# Patient Record
Sex: FEMALE | Race: WHITE | HISPANIC OR LATINO | Employment: OTHER
[De-identification: names, ages, dates, MRNs, and addresses within clinical notes are randomized per-mention and may not be internally consistent; named-entity substitution may affect disease eponyms.]

---

## 2021-01-04 ENCOUNTER — HEALTH MAINTENANCE LETTER (OUTPATIENT)
Age: 56
End: 2021-01-04

## 2021-10-11 ENCOUNTER — HEALTH MAINTENANCE LETTER (OUTPATIENT)
Age: 56
End: 2021-10-11

## 2021-12-16 ENCOUNTER — TRANSFERRED RECORDS (OUTPATIENT)
Dept: MULTI SPECIALTY CLINIC | Facility: CLINIC | Age: 56
End: 2021-12-16

## 2022-01-30 ENCOUNTER — HEALTH MAINTENANCE LETTER (OUTPATIENT)
Age: 57
End: 2022-01-30

## 2022-09-24 ENCOUNTER — HEALTH MAINTENANCE LETTER (OUTPATIENT)
Age: 57
End: 2022-09-24

## 2022-10-01 ENCOUNTER — TRANSFERRED RECORDS (OUTPATIENT)
Dept: MULTI SPECIALTY CLINIC | Facility: CLINIC | Age: 57
End: 2022-10-01

## 2023-03-16 ENCOUNTER — VIRTUAL VISIT (OUTPATIENT)
Dept: FAMILY MEDICINE | Facility: CLINIC | Age: 58
End: 2023-03-16
Payer: COMMERCIAL

## 2023-03-16 DIAGNOSIS — M54.2 CHRONIC NECK PAIN: ICD-10-CM

## 2023-03-16 DIAGNOSIS — F90.9 ADULT ATTENTION DEFICIT HYPERACTIVITY DISORDER: ICD-10-CM

## 2023-03-16 DIAGNOSIS — F41.0 GENERALIZED ANXIETY DISORDER WITH PANIC ATTACKS: Primary | ICD-10-CM

## 2023-03-16 DIAGNOSIS — G89.29 CHRONIC NECK PAIN: ICD-10-CM

## 2023-03-16 DIAGNOSIS — F33.2 SEVERE EPISODE OF RECURRENT MAJOR DEPRESSIVE DISORDER, WITHOUT PSYCHOTIC FEATURES (H): ICD-10-CM

## 2023-03-16 DIAGNOSIS — F41.1 GENERALIZED ANXIETY DISORDER WITH PANIC ATTACKS: Primary | ICD-10-CM

## 2023-03-16 DIAGNOSIS — G63 POLYNEUROPATHY ASSOCIATED WITH UNDERLYING DISEASE (H): ICD-10-CM

## 2023-03-16 PROBLEM — K76.0 FATTY LIVER: Status: ACTIVE | Noted: 2019-08-26

## 2023-03-16 PROBLEM — J45.30 MILD PERSISTENT ASTHMA WITHOUT COMPLICATION: Status: ACTIVE | Noted: 2018-04-27

## 2023-03-16 PROBLEM — R60.0 LOWER EXTREMITY EDEMA: Status: ACTIVE | Noted: 2022-01-25

## 2023-03-16 PROBLEM — E11.3299 MILD NONPROLIFERATIVE DIABETIC RETINOPATHY ASSOCIATED WITH TYPE 2 DIABETES MELLITUS (H): Status: ACTIVE | Noted: 2020-08-05

## 2023-03-16 PROBLEM — K31.84 GASTROPARESIS: Status: ACTIVE | Noted: 2022-08-12

## 2023-03-16 PROBLEM — E11.8 TYPE 2 DIABETES MELLITUS WITH COMPLICATION, WITH LONG-TERM CURRENT USE OF INSULIN (H): Status: ACTIVE | Noted: 2019-06-11

## 2023-03-16 PROBLEM — Z79.4 TYPE 2 DIABETES MELLITUS WITH COMPLICATION, WITH LONG-TERM CURRENT USE OF INSULIN (H): Status: ACTIVE | Noted: 2019-06-11

## 2023-03-16 PROBLEM — N39.41 URGE INCONTINENCE OF URINE: Status: ACTIVE | Noted: 2022-01-25

## 2023-03-16 PROBLEM — G62.9 PERIPHERAL NEUROPATHY: Status: ACTIVE | Noted: 2019-08-26

## 2023-03-16 PROCEDURE — 99203 OFFICE O/P NEW LOW 30 MIN: CPT | Mod: VID | Performed by: FAMILY MEDICINE

## 2023-03-16 RX ORDER — FAMOTIDINE 20 MG/1
20 TABLET, FILM COATED ORAL 2 TIMES DAILY
COMMUNITY
Start: 2022-01-21 | End: 2023-06-07

## 2023-03-16 RX ORDER — AMMONIUM LACTATE 12 G/100G
LOTION TOPICAL PRN
COMMUNITY
Start: 2023-03-14 | End: 2023-06-07

## 2023-03-16 RX ORDER — EZETIMIBE 10 MG/1
1 TABLET ORAL DAILY
COMMUNITY
Start: 2021-12-06 | End: 2023-06-07

## 2023-03-16 RX ORDER — FLUTICASONE PROPIONATE 50 MCG
1 SPRAY, SUSPENSION (ML) NASAL DAILY PRN
COMMUNITY
Start: 2022-03-04 | End: 2023-06-07

## 2023-03-16 RX ORDER — INSULIN ASPART 100 [IU]/ML
90 INJECTION, SOLUTION INTRAVENOUS; SUBCUTANEOUS
COMMUNITY
Start: 2022-01-17

## 2023-03-16 RX ORDER — METFORMIN HCL 500 MG
1000 TABLET, EXTENDED RELEASE 24 HR ORAL DAILY
COMMUNITY
Start: 2023-02-27 | End: 2023-06-07

## 2023-03-16 RX ORDER — FLUTICASONE FUROATE 200 UG/1
1 POWDER RESPIRATORY (INHALATION) AT BEDTIME
COMMUNITY
Start: 2023-01-19 | End: 2023-04-13

## 2023-03-16 RX ORDER — SERTRALINE HYDROCHLORIDE 100 MG/1
2 TABLET, FILM COATED ORAL DAILY
COMMUNITY
Start: 2023-03-14 | End: 2023-06-07

## 2023-03-16 RX ORDER — HYDROCHLOROTHIAZIDE 12.5 MG/1
12.5 CAPSULE ORAL EVERY MORNING
COMMUNITY
Start: 2023-03-14 | End: 2023-04-13

## 2023-03-16 RX ORDER — LIRAGLUTIDE 6 MG/ML
0.6 INJECTION SUBCUTANEOUS DAILY
COMMUNITY
Start: 2023-02-27

## 2023-03-16 RX ORDER — LOSARTAN POTASSIUM 50 MG/1
1 TABLET ORAL DAILY
COMMUNITY
Start: 2023-03-14 | End: 2023-06-07

## 2023-03-16 RX ORDER — INSULIN GLARGINE 100 [IU]/ML
110 INJECTION, SOLUTION SUBCUTANEOUS 2 TIMES DAILY
COMMUNITY
Start: 2023-02-07

## 2023-03-16 RX ORDER — ALBUTEROL SULFATE 90 UG/1
1-2 AEROSOL, METERED RESPIRATORY (INHALATION) EVERY 4 HOURS PRN
COMMUNITY
Start: 2022-03-04 | End: 2023-06-07

## 2023-03-16 RX ORDER — PRAMIPEXOLE DIHYDROCHLORIDE 0.25 MG/1
2 TABLET ORAL AT BEDTIME
COMMUNITY
Start: 2021-12-07 | End: 2023-06-07

## 2023-03-16 RX ORDER — ATORVASTATIN CALCIUM 80 MG/1
1 TABLET, FILM COATED ORAL DAILY
COMMUNITY
Start: 2023-03-06 | End: 2023-06-07

## 2023-03-16 RX ORDER — GABAPENTIN 600 MG/1
600 TABLET ORAL 3 TIMES DAILY
COMMUNITY
Start: 2023-03-14 | End: 2023-06-07

## 2023-03-16 ASSESSMENT — PATIENT HEALTH QUESTIONNAIRE - PHQ9
SUM OF ALL RESPONSES TO PHQ QUESTIONS 1-9: 14
SUM OF ALL RESPONSES TO PHQ QUESTIONS 1-9: 14
10. IF YOU CHECKED OFF ANY PROBLEMS, HOW DIFFICULT HAVE THESE PROBLEMS MADE IT FOR YOU TO DO YOUR WORK, TAKE CARE OF THINGS AT HOME, OR GET ALONG WITH OTHER PEOPLE: VERY DIFFICULT

## 2023-03-16 ASSESSMENT — ANXIETY QUESTIONNAIRES
8. IF YOU CHECKED OFF ANY PROBLEMS, HOW DIFFICULT HAVE THESE MADE IT FOR YOU TO DO YOUR WORK, TAKE CARE OF THINGS AT HOME, OR GET ALONG WITH OTHER PEOPLE?: SOMEWHAT DIFFICULT
7. FEELING AFRAID AS IF SOMETHING AWFUL MIGHT HAPPEN: NEARLY EVERY DAY
GAD7 TOTAL SCORE: 12
1. FEELING NERVOUS, ANXIOUS, OR ON EDGE: SEVERAL DAYS
4. TROUBLE RELAXING: SEVERAL DAYS
IF YOU CHECKED OFF ANY PROBLEMS ON THIS QUESTIONNAIRE, HOW DIFFICULT HAVE THESE PROBLEMS MADE IT FOR YOU TO DO YOUR WORK, TAKE CARE OF THINGS AT HOME, OR GET ALONG WITH OTHER PEOPLE: SOMEWHAT DIFFICULT
2. NOT BEING ABLE TO STOP OR CONTROL WORRYING: SEVERAL DAYS
GAD7 TOTAL SCORE: 12
6. BECOMING EASILY ANNOYED OR IRRITABLE: NEARLY EVERY DAY
GAD7 TOTAL SCORE: 12
7. FEELING AFRAID AS IF SOMETHING AWFUL MIGHT HAPPEN: NEARLY EVERY DAY
3. WORRYING TOO MUCH ABOUT DIFFERENT THINGS: MORE THAN HALF THE DAYS
5. BEING SO RESTLESS THAT IT IS HARD TO SIT STILL: SEVERAL DAYS

## 2023-04-13 ENCOUNTER — TELEPHONE (OUTPATIENT)
Dept: FAMILY MEDICINE | Facility: CLINIC | Age: 58
End: 2023-04-13
Payer: COMMERCIAL

## 2023-04-13 DIAGNOSIS — I10 ESSENTIAL HYPERTENSION: Primary | ICD-10-CM

## 2023-04-13 DIAGNOSIS — J45.30 MILD PERSISTENT ASTHMA WITHOUT COMPLICATION: ICD-10-CM

## 2023-04-13 NOTE — TELEPHONE ENCOUNTER
Patient Quality Outreach    Patient is due for the following:   Diabetes -  A1C, Eye Exam, Microalbumin and Foot Exam  Asthma  -  ACT needed and AAP  Colon Cancer Screening  Breast Cancer Screening - Mammogram  Cervical Cancer Screening - PAP Needed  Depression  -  DAP  Physical Preventive Adult Physical      Topic Date Due     Hepatitis B Vaccine (1 of 3 - 3-dose series) Never done     Pneumococcal Vaccine (1 - PCV) Never done     Diptheria Tetanus Pertussis (DTAP/TDAP/TD) Vaccine (1 - Tdap) Never done     Zoster (Shingles) Vaccine (1 of 2) Never done     COVID-19 Vaccine (3 - Booster for Moderna series) 06/29/2021     Flu Vaccine (1) 09/01/2022       Next Steps:   Patient has upcoming appointment, these items will be addressed at that time.4/14    Type of outreach:    Chart review performed, no outreach needed.    Next Steps:  Reach out within 90 days via gripNotet.    Max number of attempts reached: No. Will try again in 90 days if patient still on fail list.    Questions for provider review:    None     Makenzie Camara  Chart routed to Care Team.

## 2023-04-13 NOTE — TELEPHONE ENCOUNTER
"Requested Prescriptions   Pending Prescriptions Disp Refills     hydrochlorothiazide (MICROZIDE) 12.5 MG capsule       Sig: Take 1 capsule (12.5 mg) by mouth every morning       Diuretics (Including Combos) Protocol Failed - 4/13/2023 11:16 AM        Failed - Blood pressure under 140/90 in past 12 months     BP Readings from Last 3 Encounters:   No data found for BP                 Failed - Normal serum creatinine on file in past 12 months     No lab results found.           Failed - Normal serum potassium on file in past 12 months     No lab results found.                 Failed - Normal serum sodium on file in past 12 months     No lab results found.           Passed - Recent (12 mo) or future (30 days) visit within the authorizing provider's specialty     Patient has had an office visit with the authorizing provider or a provider within the authorizing providers department within the previous 12 mos or has a future within next 30 days. See \"Patient Info\" tab in [facility], or \"Choose Columns\" in Meds & Orders section of the refill encounter.              Passed - Medication is active on med list        Passed - Patient is age 18 or older        Passed - No active pregancy on record        Passed - No positive pregnancy test in past 12 months           fluticasone (ARNUITY ELLIPTA) 200 MCG/ACT inhaler       Sig: Inhale 1 puff into the lungs At Bedtime       Inhaled Steroids Protocol Failed - 4/13/2023 11:16 AM        Failed - Asthma control assessment score within normal limits in last 6 months     Please review ACT score.           Passed - Patient is age 12 or older        Passed - Medication is active on med list        Passed - Recent (6 mo) or future (30 days) visit within the authorizing provider's specialty     Patient had office visit in the last 6 months or has a visit in the next 30 days with authorizing provider or within the authorizing provider's specialty.  See \"Patient Info\" tab in [facility], or " "\"Choose Columns\" in Meds & Orders section of the refill encounter.             Routing refill request to provider for review/approval because medication did not pass the protocol.    Pt has a appointment on 04/14/23    Ryanne Mckeon RN  [facility]       "

## 2023-04-17 RX ORDER — FLUTICASONE FUROATE 200 UG/1
1 POWDER RESPIRATORY (INHALATION) AT BEDTIME
Qty: 30 EACH | Refills: 11 | Status: SHIPPED | OUTPATIENT
Start: 2023-04-17 | End: 2023-06-07

## 2023-04-17 RX ORDER — HYDROCHLOROTHIAZIDE 12.5 MG/1
12.5 CAPSULE ORAL EVERY MORNING
Qty: 90 CAPSULE | Refills: 3 | Status: SHIPPED | OUTPATIENT
Start: 2023-04-17 | End: 2023-06-07

## 2023-05-08 ENCOUNTER — HEALTH MAINTENANCE LETTER (OUTPATIENT)
Age: 58
End: 2023-05-08

## 2023-06-07 ENCOUNTER — VIRTUAL VISIT (OUTPATIENT)
Dept: FAMILY MEDICINE | Facility: CLINIC | Age: 58
End: 2023-06-07
Payer: COMMERCIAL

## 2023-06-07 DIAGNOSIS — I10 ESSENTIAL HYPERTENSION: ICD-10-CM

## 2023-06-07 DIAGNOSIS — L85.3 DRY SKIN DERMATITIS: ICD-10-CM

## 2023-06-07 DIAGNOSIS — F41.1 GAD (GENERALIZED ANXIETY DISORDER): ICD-10-CM

## 2023-06-07 DIAGNOSIS — K21.9 GASTROESOPHAGEAL REFLUX DISEASE, UNSPECIFIED WHETHER ESOPHAGITIS PRESENT: ICD-10-CM

## 2023-06-07 DIAGNOSIS — F33.2 SEVERE EPISODE OF RECURRENT MAJOR DEPRESSIVE DISORDER, WITHOUT PSYCHOTIC FEATURES (H): ICD-10-CM

## 2023-06-07 DIAGNOSIS — Z79.4 TYPE 2 DIABETES MELLITUS WITH COMPLICATION, WITH LONG-TERM CURRENT USE OF INSULIN (H): ICD-10-CM

## 2023-06-07 DIAGNOSIS — E11.8 TYPE 2 DIABETES MELLITUS WITH COMPLICATION, WITH LONG-TERM CURRENT USE OF INSULIN (H): ICD-10-CM

## 2023-06-07 DIAGNOSIS — J45.30 MILD PERSISTENT ASTHMA WITHOUT COMPLICATION: ICD-10-CM

## 2023-06-07 DIAGNOSIS — G25.81 RESTLESS LEG SYNDROME: Primary | ICD-10-CM

## 2023-06-07 PROCEDURE — 99214 OFFICE O/P EST MOD 30 MIN: CPT | Mod: VID | Performed by: FAMILY MEDICINE

## 2023-06-07 RX ORDER — GABAPENTIN 600 MG/1
600 TABLET ORAL 3 TIMES DAILY
Qty: 90 TABLET | Refills: 3 | Status: SHIPPED | OUTPATIENT
Start: 2023-06-07 | End: 2023-09-12

## 2023-06-07 RX ORDER — FAMOTIDINE 20 MG/1
20 TABLET, FILM COATED ORAL 2 TIMES DAILY
Qty: 180 TABLET | Refills: 3 | Status: SHIPPED | OUTPATIENT
Start: 2023-06-07 | End: 2024-05-14

## 2023-06-07 RX ORDER — FLUTICASONE PROPIONATE 50 MCG
1 SPRAY, SUSPENSION (ML) NASAL DAILY PRN
Qty: 16 G | Refills: 11 | Status: SHIPPED | OUTPATIENT
Start: 2023-06-07 | End: 2023-06-07

## 2023-06-07 RX ORDER — CLONAZEPAM 0.5 MG/1
0.5 TABLET ORAL 2 TIMES DAILY PRN
Qty: 60 TABLET | Refills: 5 | Status: SHIPPED | OUTPATIENT
Start: 2023-06-07 | End: 2023-12-20

## 2023-06-07 RX ORDER — ATORVASTATIN CALCIUM 80 MG/1
80 TABLET, FILM COATED ORAL DAILY
Qty: 90 TABLET | Refills: 3 | Status: SHIPPED | OUTPATIENT
Start: 2023-06-07 | End: 2024-05-14

## 2023-06-07 RX ORDER — SERTRALINE HYDROCHLORIDE 100 MG/1
200 TABLET, FILM COATED ORAL DAILY
Qty: 180 TABLET | Refills: 3 | Status: SHIPPED | OUTPATIENT
Start: 2023-06-07 | End: 2024-05-14

## 2023-06-07 RX ORDER — ALBUTEROL SULFATE 90 UG/1
1-2 AEROSOL, METERED RESPIRATORY (INHALATION) EVERY 4 HOURS PRN
Qty: 18 G | Refills: 11 | Status: SHIPPED | OUTPATIENT
Start: 2023-06-07 | End: 2023-06-07

## 2023-06-07 RX ORDER — METFORMIN HCL 500 MG
500 TABLET, EXTENDED RELEASE 24 HR ORAL 2 TIMES DAILY WITH MEALS
Qty: 180 TABLET | Refills: 3 | Status: SHIPPED | OUTPATIENT
Start: 2023-06-07 | End: 2024-05-14

## 2023-06-07 RX ORDER — PRAMIPEXOLE DIHYDROCHLORIDE 0.25 MG/1
0.5 TABLET ORAL AT BEDTIME
Qty: 180 TABLET | Refills: 3 | Status: SHIPPED | OUTPATIENT
Start: 2023-06-07 | End: 2023-06-07

## 2023-06-07 RX ORDER — FLUTICASONE FUROATE 200 UG/1
1 POWDER RESPIRATORY (INHALATION) AT BEDTIME
Qty: 30 EACH | Refills: 11 | Status: SHIPPED | OUTPATIENT
Start: 2023-06-07 | End: 2023-06-07

## 2023-06-07 RX ORDER — AMMONIUM LACTATE 12 G/100G
LOTION TOPICAL PRN
Qty: 500 G | Refills: 11 | Status: SHIPPED | OUTPATIENT
Start: 2023-06-07 | End: 2023-09-08

## 2023-06-07 RX ORDER — SERTRALINE HYDROCHLORIDE 100 MG/1
200 TABLET, FILM COATED ORAL DAILY
Qty: 180 TABLET | Refills: 3 | Status: SHIPPED | OUTPATIENT
Start: 2023-06-07 | End: 2023-06-07

## 2023-06-07 RX ORDER — GABAPENTIN 600 MG/1
600 TABLET ORAL 3 TIMES DAILY
Qty: 90 TABLET | Refills: 3 | Status: SHIPPED | OUTPATIENT
Start: 2023-06-07 | End: 2023-06-07

## 2023-06-07 RX ORDER — FAMOTIDINE 20 MG/1
20 TABLET, FILM COATED ORAL 2 TIMES DAILY
Qty: 180 TABLET | Refills: 3 | Status: SHIPPED | OUTPATIENT
Start: 2023-06-07 | End: 2023-06-07

## 2023-06-07 RX ORDER — LOSARTAN POTASSIUM 50 MG/1
50 TABLET ORAL DAILY
Qty: 90 TABLET | Refills: 3 | Status: SHIPPED | OUTPATIENT
Start: 2023-06-07 | End: 2024-05-14

## 2023-06-07 RX ORDER — EZETIMIBE 10 MG/1
10 TABLET ORAL DAILY
Qty: 90 TABLET | Refills: 3 | Status: SHIPPED | OUTPATIENT
Start: 2023-06-07 | End: 2024-05-14

## 2023-06-07 RX ORDER — EZETIMIBE 10 MG/1
10 TABLET ORAL DAILY
Qty: 90 TABLET | Refills: 3 | Status: SHIPPED | OUTPATIENT
Start: 2023-06-07 | End: 2023-06-07

## 2023-06-07 RX ORDER — LOSARTAN POTASSIUM 50 MG/1
50 TABLET ORAL DAILY
Qty: 90 TABLET | Refills: 3 | Status: SHIPPED | OUTPATIENT
Start: 2023-06-07 | End: 2023-06-07

## 2023-06-07 RX ORDER — AMMONIUM LACTATE 12 G/100G
LOTION TOPICAL PRN
Qty: 500 G | Refills: 11 | Status: SHIPPED | OUTPATIENT
Start: 2023-06-07 | End: 2023-06-07

## 2023-06-07 RX ORDER — FLUTICASONE FUROATE 200 UG/1
1 POWDER RESPIRATORY (INHALATION) AT BEDTIME
Qty: 30 EACH | Refills: 11 | Status: SHIPPED | OUTPATIENT
Start: 2023-06-07 | End: 2024-06-03

## 2023-06-07 RX ORDER — METFORMIN HCL 500 MG
500 TABLET, EXTENDED RELEASE 24 HR ORAL 2 TIMES DAILY WITH MEALS
Qty: 180 TABLET | Refills: 3 | Status: SHIPPED | OUTPATIENT
Start: 2023-06-07 | End: 2023-06-07

## 2023-06-07 RX ORDER — HYDROCHLOROTHIAZIDE 12.5 MG/1
12.5 CAPSULE ORAL EVERY MORNING
Qty: 90 CAPSULE | Refills: 3 | Status: SHIPPED | OUTPATIENT
Start: 2023-06-07 | End: 2024-06-12

## 2023-06-07 RX ORDER — ATORVASTATIN CALCIUM 80 MG/1
80 TABLET, FILM COATED ORAL DAILY
Qty: 90 TABLET | Refills: 3 | Status: SHIPPED | OUTPATIENT
Start: 2023-06-07 | End: 2023-06-07

## 2023-06-07 RX ORDER — FLUTICASONE PROPIONATE 50 MCG
1 SPRAY, SUSPENSION (ML) NASAL DAILY PRN
Qty: 16 G | Refills: 11 | Status: SHIPPED | OUTPATIENT
Start: 2023-06-07

## 2023-06-07 RX ORDER — HYDROCHLOROTHIAZIDE 12.5 MG/1
12.5 CAPSULE ORAL EVERY MORNING
Qty: 90 CAPSULE | Refills: 3 | Status: SHIPPED | OUTPATIENT
Start: 2023-06-07 | End: 2023-06-07

## 2023-06-07 RX ORDER — ALBUTEROL SULFATE 90 UG/1
1-2 AEROSOL, METERED RESPIRATORY (INHALATION) EVERY 4 HOURS PRN
Qty: 18 G | Refills: 11 | Status: SHIPPED | OUTPATIENT
Start: 2023-06-07 | End: 2024-05-14

## 2023-06-07 RX ORDER — PRAMIPEXOLE DIHYDROCHLORIDE 0.25 MG/1
0.5 TABLET ORAL AT BEDTIME
Qty: 180 TABLET | Refills: 3 | Status: SHIPPED | OUTPATIENT
Start: 2023-06-07 | End: 2024-05-14

## 2023-06-07 ASSESSMENT — PATIENT HEALTH QUESTIONNAIRE - PHQ9
SUM OF ALL RESPONSES TO PHQ QUESTIONS 1-9: 20
10. IF YOU CHECKED OFF ANY PROBLEMS, HOW DIFFICULT HAVE THESE PROBLEMS MADE IT FOR YOU TO DO YOUR WORK, TAKE CARE OF THINGS AT HOME, OR GET ALONG WITH OTHER PEOPLE: VERY DIFFICULT
SUM OF ALL RESPONSES TO PHQ QUESTIONS 1-9: 20

## 2023-06-07 NOTE — PROGRESS NOTES
Gillian is a 57 year old who is being evaluated via a billable video visit.      How would you like to obtain your AVS? MyChart  If the video visit is dropped, the invitation should be resent by: Text to cell phone: [phone]  Will anyone else be joining your video visit? No    Assessment & Plan     Mild persistent asthma without complication  - albuterol (PROAIR HFA/PROVENTIL HFA/VENTOLIN HFA) 108 (90 Base) MCG/ACT inhaler; Inhale 1-2 puffs into the lungs every 4 hours as needed for wheezing  - fluticasone (ARNUITY ELLIPTA) 200 MCG/ACT inhaler; Inhale 1 puff into the lungs At Bedtime  - fluticasone (FLONASE) 50 MCG/ACT nasal spray; Spray 1 spray in nostril daily as needed for allergies    Essential hypertension  - hydrochlorothiazide (MICROZIDE) 12.5 MG capsule; Take 1 capsule (12.5 mg) by mouth every morning  - losartan (COZAAR) 50 MG tablet; Take 1 tablet (50 mg) by mouth daily    Restless leg syndrome  - pramipexole (MIRAPEX) 0.25 MG tablet; Take 2 tablets (0.5 mg) by mouth At Bedtime    Severe episode of recurrent major depressive disorder, without psychotic features (H)  - sertraline (ZOLOFT) 100 MG tablet; Take 2 tablets (200 mg) by mouth daily    Gastroesophageal reflux disease, unspecified whether esophagitis present  - famotidine (PEPCID) 20 MG tablet; Take 1 tablet (20 mg) by mouth 2 times daily  - omeprazole (PRILOSEC) 20 MG DR capsule; Take 1 capsule (20 mg) by mouth daily before breakfast    Type 2 diabetes mellitus with complication, with long-term current use of insulin (H)  - Adult Eye  Referral; Future  - atorvastatin (LIPITOR) 80 MG tablet; Take 1 tablet (80 mg) by mouth daily  - ezetimibe (ZETIA) 10 MG tablet; Take 1 tablet (10 mg) by mouth daily  - gabapentin (NEURONTIN) 600 MG tablet; Take 1 tablet (600 mg) by mouth 3 times daily  - metFORMIN (GLUCOPHAGE XR) 500 MG 24 hr tablet; Take 1 tablet (500 mg) by mouth 2 times daily (with meals)    JAMES (generalized anxiety disorder)  -  clonazePAM (KLONOPIN) 0.5 MG tablet; Take 1 tablet (0.5 mg) by mouth 2 times daily as needed for anxiety    Dry skin dermatitis  - ammonium lactate (LAC-HYDRIN) 12 % external lotion; Apply topically as needed for dry skin    Jose Sanchez DO  [facility] JOSH French is a 57 year old, presenting for the following health issues:  No chief complaint on file.         View : No data to display.              History of Present Illness       She eats 2-3 servings of fruits and vegetables daily.She consumes 0 sweetened beverage(s) daily.She exercises with enough effort to increase her heart rate 10 to 19 minutes per day.  She is missing 4 dose(s) of medications per week.    Today's PHQ-9         PHQ-9 Total Score: 20    PHQ-9 Q9 Thoughts of better off dead/self-harm past 2 weeks :   Not at all    How difficult have these problems made it for you to do your work, take care of things at home, or get along with other people: Very difficult       Took her vitals today   Weight 177.9  Temp 97  bp 157/69-  Hasn't taken losartan for 2 weeks- supposed to be arriving today   Glucose- 379- hasn't been taking her insulin bcause she is sleeping a lot during the day and has insomnia at night.  She says her anxiety is really bad     Rescheduled to video today from in person due to poor air quality     Full med rec done    Anxiety really bad   Insomnia bad  Out of clonazepam for at least a month             Objective           Vitals:  No vitals were obtained today due to virtual visit.    Physical Exam  Constitutional:       General: She is not in acute distress.  Pulmonary:      Effort: No respiratory distress.   Neurological:      Mental Status: She is alert.   Psychiatric:         Mood and Affect: Mood normal.         Behavior: Behavior normal.             Video-Visit Details    Type of service:  Video Visit   Video Start Time: 1523  Video End Time:1549    Originating Location (pt. Location):  Home  Distant Location (provider location):  On-site  Platform used for Video Visit: Rex

## 2023-06-27 ENCOUNTER — APPOINTMENT (OUTPATIENT)
Dept: OPTOMETRY | Facility: CLINIC | Age: 58
End: 2023-06-27
Payer: COMMERCIAL

## 2023-06-27 ENCOUNTER — OFFICE VISIT (OUTPATIENT)
Dept: OPHTHALMOLOGY | Facility: CLINIC | Age: 58
End: 2023-06-27
Attending: FAMILY MEDICINE
Payer: COMMERCIAL

## 2023-06-27 DIAGNOSIS — H52.31 ANISOMETROPIA: ICD-10-CM

## 2023-06-27 DIAGNOSIS — Z79.4 TYPE 2 DIABETES MELLITUS WITH COMPLICATION, WITH LONG-TERM CURRENT USE OF INSULIN (H): Primary | ICD-10-CM

## 2023-06-27 DIAGNOSIS — H43.812 POSTERIOR VITREOUS DETACHMENT, LEFT EYE: ICD-10-CM

## 2023-06-27 DIAGNOSIS — H53.002 AMBLYOPIA OF LEFT EYE: ICD-10-CM

## 2023-06-27 DIAGNOSIS — Z98.890 HISTORY OF LASER PHOTOCOAGULATION OF RETINA: ICD-10-CM

## 2023-06-27 DIAGNOSIS — Z01.01 ENCOUNTER FOR EXAMINATION OF EYES AND VISION WITH ABNORMAL FINDINGS: ICD-10-CM

## 2023-06-27 DIAGNOSIS — E11.8 TYPE 2 DIABETES MELLITUS WITH COMPLICATION, WITH LONG-TERM CURRENT USE OF INSULIN (H): Primary | ICD-10-CM

## 2023-06-27 DIAGNOSIS — H25.813 COMBINED FORMS OF AGE-RELATED CATARACT OF BOTH EYES: ICD-10-CM

## 2023-06-27 DIAGNOSIS — H52.4 PRESBYOPIA: ICD-10-CM

## 2023-06-27 PROCEDURE — V2200 LENS SPHER BIFOC PLANO 4.00D: HCPCS | Mod: LT | Performed by: OPTOMETRIST

## 2023-06-27 PROCEDURE — 92015 DETERMINE REFRACTIVE STATE: CPT | Performed by: OPHTHALMOLOGY

## 2023-06-27 PROCEDURE — V2020 VISION SVCS FRAMES PURCHASES: HCPCS | Performed by: OPTOMETRIST

## 2023-06-27 PROCEDURE — 92004 COMPRE OPH EXAM NEW PT 1/>: CPT | Performed by: OPHTHALMOLOGY

## 2023-06-27 ASSESSMENT — CONF VISUAL FIELD
OD_INFERIOR_NASAL_RESTRICTION: 0
OS_SUPERIOR_NASAL_RESTRICTION: 0
OS_NORMAL: 1
OS_INFERIOR_TEMPORAL_RESTRICTION: 0
OS_INFERIOR_NASAL_RESTRICTION: 0
OD_SUPERIOR_TEMPORAL_RESTRICTION: 0
OD_NORMAL: 1
OS_SUPERIOR_TEMPORAL_RESTRICTION: 0
OD_INFERIOR_TEMPORAL_RESTRICTION: 0
OD_SUPERIOR_NASAL_RESTRICTION: 0

## 2023-06-27 ASSESSMENT — TONOMETRY
IOP_METHOD: APPLANATION
OS_IOP_MMHG: 15
OD_IOP_MMHG: 16

## 2023-06-27 ASSESSMENT — REFRACTION_MANIFEST
OD_ADD: +2.75
OS_AXIS: 075
OS_CYLINDER: +1.75
OS_SPHERE: -7.25
OS_ADD: +2.75
OD_AXIS: 149
OD_SPHERE: -1.25
OD_CYLINDER: +0.75

## 2023-06-27 ASSESSMENT — VISUAL ACUITY
OS_PH_SC: 20/300
OD_SC+: -2
OD_SC: 20/40
METHOD: SNELLEN - LINEAR
OS_SC: CF

## 2023-06-27 ASSESSMENT — SLIT LAMP EXAM - LIDS
COMMENTS: 1+ DERMATOCHALASIS, 1+ SCLERAL SHOW
COMMENTS: 1+ DERMATOCHALASIS, 1+ SCLERAL SHOW

## 2023-06-27 ASSESSMENT — EXTERNAL EXAM - LEFT EYE: OS_EXAM: NORMAL

## 2023-06-27 ASSESSMENT — CUP TO DISC RATIO
OS_RATIO: 0.1
OD_RATIO: 0.3

## 2023-06-27 ASSESSMENT — EXTERNAL EXAM - RIGHT EYE: OD_EXAM: NORMAL

## 2023-06-27 NOTE — PATIENT INSTRUCTIONS
"Glasses prescription given - optional  May use artificial tears up to four times a day (like Refresh Optive, Systane Balance, TheraTears, or generic artificial tears are ok. Avoid \"get the red out\" drops).   Possible posterior vitreous detachment (sudden onset large floater and/or flashing lights) right eye discussed.  Call in February 2024 for an appointment in June 2024 for Complete Exam.    Dr. Spicer [phone]      Patient Education   Diabetes weakens the blood vessels all over the body, including the eyes. Damage to the blood vessels in the eyes can cause swelling or bleeding into part of the eye (called the retina). This is called diabetic retinopathy (SARAH-tin--puh-thee). If not treated, this disease can cause vision loss or blindness.   Symptoms may include blurred or distorted vision, but many people have no symptoms. It's important to see your eye doctor regularly to check for problems.   Early treatment and good control can help protect your vision. Here are the things you can do to help prevent vision loss:      1. Keep your blood sugar levels under tight control.      2. Bring high blood pressure under control.      3. No smoking.      4. Have yearly dilated eye exams.       "

## 2023-06-27 NOTE — PROGRESS NOTES
" Current Eye Medications: Artificial tears as needed      Subjective:  Here for Diabetic Eye Exam. Last eye exam was 1 year ago. Patient complains of vision being blurry at distance and near. Started gradually 2 - 3 years ago. Patient wears over the counter reading glasses for near work and prefers not to wear rx glasses for distance. Hx of amblyopia left eye. Patient states that she had laser eye surgery 20 years ago in both eyes for floaters.     Patient reports last A1c was at least 12 (3 months ago).  Blood sugar is currently 363.    DM dxed about 1996.  No hx of muscle surgery or patching.     Objective:  See Ophthalmology Exam.       Assessment:  Baseline eye exam in patient with longstanding diabetes.  No obvious diabetic retinopathy on exam today.  Mild-moderate cataracts and hx of retinal laser left eye for retinal tears or lattice.      ICD-10-CM    1. Type 2 diabetes mellitus with complication, with long-term current use of insulin (H)  E11.8 Adult Eye  Referral    Z79.4       2. Combined forms of age-related cataract, mild-mod, of both eyes  H25.813       3. Amblyopia, mod, of left eye  H53.002       4. History of laser photocoagulation of retina, os  Z98.890       5. Anisometropia  H52.31       6. Posterior vitreous detachment, left eye  H43.812       7. Encounter for examination of eyes and vision with abnormal findings  Z01.01       8. Presbyopia  H52.4            Plan:  Glasses prescription given - optional  May use artificial tears up to four times a day (like Refresh Optive, Systane Balance, TheraTears, or generic artificial tears are ok. Avoid \"get the red out\" drops).   Possible posterior vitreous detachment (sudden onset large floater and/or flashing lights) right eye discussed.  Call in February 2024 for an appointment in June 2024 for Complete Exam.    Dr. Spicer [phone]         "

## 2023-06-27 NOTE — LETTER
"    6/27/2023         RE: Gillian Camara  [address]  [place] [address]        Dear Colleague,    Thank you for referring your patient, Gillian Camara, to the [facility]. Please see a copy of my visit note below.     Current Eye Medications: Artificial tears as needed      Subjective:  Here for Diabetic Eye Exam. Last eye exam was 1 year ago. Patient complains of vision being blurry at distance and near. Started gradually 2 - 3 years ago. Patient wears over the counter reading glasses for near work and prefers not to wear rx glasses for distance. Hx of amblyopia left eye. Patient states that she had laser eye surgery 20 years ago in both eyes for floaters.     Patient reports last A1c was at least 12 (3 months ago).  Blood sugar is currently 363.    DM dxed about 1996.  No hx of muscle surgery or patching.     Objective:  See Ophthalmology Exam.       Assessment:  Baseline eye exam in patient with longstanding diabetes.  No obvious diabetic retinopathy on exam today.  Mild-moderate cataracts and hx of retinal laser left eye for retinal tears or lattice.      ICD-10-CM    1. Type 2 diabetes mellitus with complication, with long-term current use of insulin (H)  E11.8 Adult Eye  Referral    Z79.4       2. Combined forms of age-related cataract, mild-mod, of both eyes  H25.813       3. Amblyopia, mod, of left eye  H53.002       4. History of laser photocoagulation of retina, os  Z98.890       5. Anisometropia  H52.31       6. Posterior vitreous detachment, left eye  H43.812       7. Encounter for examination of eyes and vision with abnormal findings  Z01.01       8. Presbyopia  H52.4            Plan:  Glasses prescription given - optional  May use artificial tears up to four times a day (like Refresh Optive, Systane Balance, TheraTears, or generic artificial tears are ok. Avoid \"get the red out\" drops).   Possible posterior vitreous detachment (sudden onset large floater " and/or flashing lights) right eye discussed.  Call in February 2024 for an appointment in June 2024 for Complete Exam.    Dr. Spicer [phone]             Again, thank you for allowing me to participate in the care of your patient.        Sincerely,        Niraj Spicer MD

## 2023-06-29 PROBLEM — H43.812 POSTERIOR VITREOUS DETACHMENT, LEFT EYE: Status: ACTIVE | Noted: 2023-06-29

## 2023-06-29 PROBLEM — Z98.890 HISTORY OF LASER PHOTOCOAGULATION OF RETINA: Status: ACTIVE | Noted: 2023-06-29

## 2023-06-29 PROBLEM — H53.002 AMBLYOPIA OF LEFT EYE: Status: ACTIVE | Noted: 2023-06-29

## 2023-06-29 PROBLEM — H25.813 COMBINED FORMS OF AGE-RELATED CATARACT OF BOTH EYES: Status: ACTIVE | Noted: 2023-06-29

## 2023-06-29 PROBLEM — H52.31 ANISOMETROPIA: Status: ACTIVE | Noted: 2023-06-29

## 2023-07-11 ENCOUNTER — APPOINTMENT (OUTPATIENT)
Dept: OPTOMETRY | Facility: CLINIC | Age: 58
End: 2023-07-11
Payer: COMMERCIAL

## 2023-07-11 PROCEDURE — 92341 FIT SPECTACLES BIFOCAL: CPT | Performed by: OPTOMETRIST

## 2023-07-27 ENCOUNTER — VIRTUAL VISIT (OUTPATIENT)
Dept: PSYCHOLOGY | Facility: CLINIC | Age: 58
End: 2023-07-27
Payer: COMMERCIAL

## 2023-07-27 DIAGNOSIS — F41.1 GAD (GENERALIZED ANXIETY DISORDER): Primary | ICD-10-CM

## 2023-07-27 PROCEDURE — 90834 PSYTX W PT 45 MINUTES: CPT | Mod: VID

## 2023-07-27 ASSESSMENT — ANXIETY QUESTIONNAIRES
7. FEELING AFRAID AS IF SOMETHING AWFUL MIGHT HAPPEN: MORE THAN HALF THE DAYS
6. BECOMING EASILY ANNOYED OR IRRITABLE: NEARLY EVERY DAY
GAD7 TOTAL SCORE: 12
2. NOT BEING ABLE TO STOP OR CONTROL WORRYING: SEVERAL DAYS
5. BEING SO RESTLESS THAT IT IS HARD TO SIT STILL: MORE THAN HALF THE DAYS
4. TROUBLE RELAXING: SEVERAL DAYS
3. WORRYING TOO MUCH ABOUT DIFFERENT THINGS: SEVERAL DAYS
GAD7 TOTAL SCORE: 12
IF YOU CHECKED OFF ANY PROBLEMS ON THIS QUESTIONNAIRE, HOW DIFFICULT HAVE THESE PROBLEMS MADE IT FOR YOU TO DO YOUR WORK, TAKE CARE OF THINGS AT HOME, OR GET ALONG WITH OTHER PEOPLE: VERY DIFFICULT
1. FEELING NERVOUS, ANXIOUS, OR ON EDGE: MORE THAN HALF THE DAYS

## 2023-07-27 ASSESSMENT — COLUMBIA-SUICIDE SEVERITY RATING SCALE - C-SSRS
2. HAVE YOU ACTUALLY HAD ANY THOUGHTS OF KILLING YOURSELF IN THE PAST MONTH?: NO
1. IN THE PAST MONTH, HAVE YOU WISHED YOU WERE DEAD OR WISHED YOU COULD GO TO SLEEP AND NOT WAKE UP?: NO
5. HAVE YOU STARTED TO WORK OUT OR WORKED OUT THE DETAILS OF HOW TO KILL YOURSELF? DO YOU INTEND TO CARRY OUT THIS PLAN?: NO
4. HAVE YOU HAD THESE THOUGHTS AND HAD SOME INTENTION OF ACTING ON THEM?: NO
3. HAVE YOU BEEN THINKING ABOUT HOW YOU MIGHT KILL YOURSELF?: NO
6. HAVE YOU EVER DONE ANYTHING, STARTED TO DO ANYTHING, OR PREPARED TO DO ANYTHING TO END YOUR LIFE?: NO

## 2023-07-27 ASSESSMENT — PATIENT HEALTH QUESTIONNAIRE - PHQ9
SUM OF ALL RESPONSES TO PHQ QUESTIONS 1-9: 13
SUM OF ALL RESPONSES TO PHQ QUESTIONS 1-9: 13
10. IF YOU CHECKED OFF ANY PROBLEMS, HOW DIFFICULT HAVE THESE PROBLEMS MADE IT FOR YOU TO DO YOUR WORK, TAKE CARE OF THINGS AT HOME, OR GET ALONG WITH OTHER PEOPLE: SOMEWHAT DIFFICULT

## 2023-07-27 NOTE — PROGRESS NOTES
[facility]   Mental Health & Addiction Services     Progress Note - Initial Visit    Patient  Name:  Gillian Camara Date: 7/27/2023         Service Type: Individual     Visit Start Time: 10:00 am  Visit End Time: 10:45 am    Visit #: 1    Attendees: Client attended alone    Service Modality:  Video Visit:      Provider verified identity through the following two step process.  Patient provided:  Patient address    Telemedicine Visit: The patient's condition can be safely assessed and treated via synchronous audio and visual telemedicine encounter.      Reason for Telemedicine Visit: Patient convenience (e.g. access to timely appointments / distance to available provider)    Originating Site (Patient Location): Patient's home    Distant Site (Provider Location): Provider Remote Setting- Home Office    Consent:  The patient/guardian has verbally consented to: the potential risks and benefits of telemedicine (video visit) versus in person care; bill my insurance or make self-payment for services provided; and responsibility for payment of non-covered services.     Patient would like the video invitation sent by:  My Chart    Mode of Communication:  Video Conference via Amwell    Distant Location (Provider):  Off-site    As the provider I attest to compliance with applicable laws and regulations related to telemedicine.       DATA:   Interactive Complexity: No   Crisis: No     Presenting Concerns/  Current Stressors:  Medical comorbidities, hx of concussion, self harming/cutting behaviors, left employment, secured disability in 2021.Twin sister who was a support to patient regarding securing disability status, had Covid and passed away. From a family of 13 children (1 brother passed in infancy), close family.      ASSESSMENT:  Mental Status Assessment:  Appearance:   Appropriate   Eye Contact:   Good   Psychomotor Behavior: Normal   Attitude:   Cooperative   Orientation:   All  Speech   Rate /  Production: Emotional   Volume:  Normal   Mood:    Depressed   Affect:    Tearful  Thought Content:  Clear   Thought Form:  Coherent   Insight:    Spiritual insight      Safety Issues and Plan for Safety and Risk Management:   [place] Suicide Severity Rating Scale (Lifetime/Recent)      7/27/2023    10:00 AM   [place] Suicide Severity Rating (Lifetime/Recent)   Q1 Wished to be Dead (Past Month) no   Q2 Suicidal Thoughts (Past Month) no   Q3 Suicidal Thought Method no   Q4 Suicidal Intent without Specific Plan no   Q5 Suicide Intent with Specific Plan no   Q6 Suicide Behavior (Lifetime) no   Level of Risk per Screen low risk     Patient denies current fears or concerns for personal safety.  Patient denies current or recent suicidal ideation or behaviors.  Patient denies current or recent homicidal ideation or behaviors.  Patient denies current or recent self injurious behavior or ideation. hx of self harming  Patient denies other safety concerns.  Recommended that patient call 911 or go to the local ED should there be a change in any of these risk factors.  Patient reports there are no firearms in the house.     Diagnostic Criteria:    JAMES: feeling nervous anxious, on edge; excessive worry, trouble relaxing, restless, irritability, fear, uncontrolled worrying, worrying too much about different things    DSM5 Diagnoses: (Sustained by DSM5 Criteria Listed Above)  Diagnoses: 300.02 (F41.1) Generalized Anxiety Disorder  Psychosocial & Contextual Factors: trauma history, on disability,   WHODAS 2.0 (12 item):        No data to display              Intervention:   Psychodynamic- Patient processed internal experiences  and surfaced coping skills - activities of enjoyment including cooking/baking, genealogy, learning Luxem[place]ish  Collateral Reports Completed:  Not Applicable      PLAN: (Homework, other):  1. Provider will continue Diagnostic Assessment.  Patient was given the following to do until next session:  engage in  lilian.   2. Provider recommended the following referrals: anxiety group.      3.  Suicide Risk and Safety Concerns were assessed for Gillian Camara.    Patient meets the following risk assessment and triage: Patient denied any current/recent/lifetime history of suicidal ideation and/or behaviors.  No safety plan indicated at this time.       Cora Garcia, [facility]  July 27, 2023

## 2023-08-02 ENCOUNTER — OFFICE VISIT (OUTPATIENT)
Dept: FAMILY MEDICINE | Facility: CLINIC | Age: 58
End: 2023-08-02
Payer: COMMERCIAL

## 2023-08-02 ENCOUNTER — LAB (OUTPATIENT)
Dept: LAB | Facility: CLINIC | Age: 58
End: 2023-08-02
Payer: COMMERCIAL

## 2023-08-02 VITALS
BODY MASS INDEX: 32.34 KG/M2 | HEART RATE: 83 BPM | OXYGEN SATURATION: 99 % | WEIGHT: 182.5 LBS | TEMPERATURE: 98 F | RESPIRATION RATE: 21 BRPM | SYSTOLIC BLOOD PRESSURE: 132 MMHG | HEIGHT: 63 IN | DIASTOLIC BLOOD PRESSURE: 58 MMHG

## 2023-08-02 DIAGNOSIS — I10 ESSENTIAL HYPERTENSION: ICD-10-CM

## 2023-08-02 DIAGNOSIS — Z12.31 VISIT FOR SCREENING MAMMOGRAM: ICD-10-CM

## 2023-08-02 DIAGNOSIS — Z11.59 NEED FOR HEPATITIS C SCREENING TEST: ICD-10-CM

## 2023-08-02 DIAGNOSIS — E11.8 TYPE 2 DIABETES MELLITUS WITH COMPLICATION, WITH LONG-TERM CURRENT USE OF INSULIN (H): ICD-10-CM

## 2023-08-02 DIAGNOSIS — L84 CALLUS OF FOOT: ICD-10-CM

## 2023-08-02 DIAGNOSIS — E11.42 DIABETIC POLYNEUROPATHY ASSOCIATED WITH TYPE 2 DIABETES MELLITUS (H): ICD-10-CM

## 2023-08-02 DIAGNOSIS — J30.2 SEASONAL ALLERGIC RHINITIS, UNSPECIFIED TRIGGER: ICD-10-CM

## 2023-08-02 DIAGNOSIS — Z79.4 TYPE 2 DIABETES MELLITUS WITH COMPLICATION, WITH LONG-TERM CURRENT USE OF INSULIN (H): ICD-10-CM

## 2023-08-02 DIAGNOSIS — E11.8 TYPE 2 DIABETES MELLITUS WITH COMPLICATION, WITH LONG-TERM CURRENT USE OF INSULIN (H): Primary | ICD-10-CM

## 2023-08-02 DIAGNOSIS — Z79.4 TYPE 2 DIABETES MELLITUS WITH COMPLICATION, WITH LONG-TERM CURRENT USE OF INSULIN (H): Primary | ICD-10-CM

## 2023-08-02 LAB
ANION GAP SERPL CALCULATED.3IONS-SCNC: 11 MMOL/L (ref 7–15)
BUN SERPL-MCNC: 10.8 MG/DL (ref 6–20)
CALCIUM SERPL-MCNC: 10.2 MG/DL (ref 8.6–10)
CHLORIDE SERPL-SCNC: 105 MMOL/L (ref 98–107)
CHOLEST SERPL-MCNC: 264 MG/DL
CREAT SERPL-MCNC: 0.43 MG/DL (ref 0.51–0.95)
CREAT UR-MCNC: 74.2 MG/DL
DEPRECATED HCO3 PLAS-SCNC: 26 MMOL/L (ref 22–29)
GFR SERPL CREATININE-BSD FRML MDRD: >90 ML/MIN/1.73M2
GLUCOSE SERPL-MCNC: 124 MG/DL (ref 70–99)
HCV AB SERPL QL IA: NONREACTIVE
HDLC SERPL-MCNC: 61 MG/DL
LDLC SERPL CALC-MCNC: 169 MG/DL
MICROALBUMIN UR-MCNC: 36.5 MG/L
MICROALBUMIN/CREAT UR: 49.19 MG/G CR (ref 0–25)
NONHDLC SERPL-MCNC: 203 MG/DL
POTASSIUM SERPL-SCNC: 4.1 MMOL/L (ref 3.4–5.3)
SODIUM SERPL-SCNC: 142 MMOL/L (ref 136–145)
TRIGL SERPL-MCNC: 168 MG/DL

## 2023-08-02 PROCEDURE — 80061 LIPID PANEL: CPT

## 2023-08-02 PROCEDURE — 86803 HEPATITIS C AB TEST: CPT

## 2023-08-02 PROCEDURE — 80048 BASIC METABOLIC PNL TOTAL CA: CPT

## 2023-08-02 PROCEDURE — 36415 COLL VENOUS BLD VENIPUNCTURE: CPT

## 2023-08-02 PROCEDURE — 99214 OFFICE O/P EST MOD 30 MIN: CPT | Performed by: FAMILY MEDICINE

## 2023-08-02 PROCEDURE — 82043 UR ALBUMIN QUANTITATIVE: CPT

## 2023-08-02 PROCEDURE — 82570 ASSAY OF URINE CREATININE: CPT

## 2023-08-02 RX ORDER — TRIAMCINOLONE ACETONIDE 55 UG/1
2 SPRAY, METERED NASAL DAILY
Qty: 16.9 ML | Refills: 11 | Status: SHIPPED | OUTPATIENT
Start: 2023-08-02 | End: 2024-08-06

## 2023-08-02 ASSESSMENT — PAIN SCALES - GENERAL: PAINLEVEL: EXTREME PAIN (9)

## 2023-08-02 ASSESSMENT — ASTHMA QUESTIONNAIRES: ACT_TOTALSCORE: 16

## 2023-08-02 NOTE — PROGRESS NOTES
"  Assessment & Plan     Type 2 diabetes mellitus with complication, with long-term current use of insulin (H)  - Albumin Random Urine Quantitative with Creat Ratio; Future  - REVIEW OF HEALTH MAINTENANCE PROTOCOL ORDERS  - Lipid panel reflex to direct LDL Fasting; Future  - insulin pen needle (32G X 6 MM) 32G X 6 MM miscellaneous; Use 4 pen needles daily or as directed.    Essential hypertension  - BASIC METABOLIC PANEL; Future    Need for hepatitis C screening test  - Hepatitis C Screen Reflex to HCV RNA Quant and Genotype; Future    Visit for screening mammogram  - MA SCREENING DIGITAL BILAT - Future  (s+30); Future    Seasonal allergic rhinitis, unspecified trigger  - triamcinolone (NASACORT) 55 MCG/ACT nasal aerosol; Spray 2 sprays into both nostrils daily    Diabetic polyneuropathy associated with type 2 diabetes mellitus (H)  - Orthotics and Prosthetics DME Orthotic; Diabetic Shoe(s)/Insert(s); No; 3 pair; Progress note must support the need for shoes/orthotics. Use .diabeticfootexam or diabetic foot exam picklist in SOAPO.    Callus of foot  - Orthotics and Prosthetics DME Orthotic; Diabetic Shoe(s)/Insert(s); No; 3 pair; Progress note must support the need for shoes/orthotics. Use .diabeticfootexam or diabetic foot exam picklist in SOAPO.      BMI:   Estimated body mass index is 32.33 kg/m  as calculated from the following:    Height as of this encounter: 1.6 m (5' 3\").    Weight as of this encounter: 82.8 kg (182 lb 8 oz).   Weight management plan: Discussed healthy diet and exercise guidelines    DO [facility] is a 57 year old, presenting for the following health issues:  Diabetes, Hypertension, Lipids, Depression/ Anxiety, and Forms        8/2/2023    10:20 AM   Additional Questions   Roomed by Anthony Obrien   Accompanied by Wero -        History of Present Illness       Mental Health Follow-up:  Patient presents to follow-up on " "Depression & Anxiety.Patient's depression since last visit has been:  Better  The patient is not having other symptoms associated with depression.  Patient's anxiety since last visit has been:  No change  The patient is not having other symptoms associated with anxiety.  Any significant life events: No  Patient is not feeling anxious or having panic attacks.  Patient has no concerns about alcohol or drug use.    Diabetes:   She presents for follow up of diabetes.   She is checking home blood glucose with a continuous glucose monitor.   She checks blood glucose before meals, after meals, before and after meals and at bedtime.  Blood glucose is sometimes over 200 and sometimes under 70. She is aware of hypoglycemia symptoms including shakiness, dizziness, weakness, lethargy, blurred vision and confusion.   She is concerned about other.   She is having numbness in feet, burning in feet, excessive thirst and blurry vision.            Hyperlipidemia:  She presents for follow up of hyperlipidemia.   She is taking medication to lower cholesterol. She is not having myalgia or other side effects to statin medications.    Hypertension: She presents for follow up of hypertension.  She does check blood pressure  regularly outside of the clinic. Outpatient blood pressures have not been over 140/90. She follows a low salt diet.     Reason for visit:  Diabetes follow up and paperwork diabetes shoes    She eats 2-3 servings of fruits and vegetables daily.She consumes 0 sweetened beverage(s) daily.She exercises with enough effort to increase her heart rate 9 or less minutes per day.  She exercises with enough effort to increase her heart rate 3 or less days per week.        A1c a few dfays ago at [facility] 13.2  Has had a few lows - drinks juice then     Needs mammogram           Objective    /58   Pulse 83   Temp 98  F (36.7  C) (Temporal)   Resp 21   Ht 1.6 m (5' 3\")   Wt 82.8 kg (182 lb 8 oz)   LMP 10/01/2013 " (Approximate)   SpO2 99%   BMI 32.33 kg/m    Body mass index is 32.33 kg/m .    Physical Exam  Constitutional:       General: She is not in acute distress.  Eyes:      General: No scleral icterus.  Cardiovascular:      Pulses:           Dorsalis pedis pulses are 2+ on the right side and 2+ on the left side.        Posterior tibial pulses are 2+ on the right side and 2+ on the left side.   Pulmonary:      Effort: No respiratory distress.   Feet:      Right foot:      Protective Sensation: 5 sites tested.  0 sites sensed.      Skin integrity: Callus present.      Toenail Condition: Right toenails are normal.      Left foot:      Protective Sensation: 5 sites tested.  0 sites sensed.      Skin integrity: Callus present.      Toenail Condition: Left toenails are normal.   Neurological:      Mental Status: She is alert.   Psychiatric:         Mood and Affect: Mood normal.         Behavior: Behavior normal.

## 2023-08-03 ENCOUNTER — TELEPHONE (OUTPATIENT)
Dept: FAMILY MEDICINE | Facility: CLINIC | Age: 58
End: 2023-08-03
Payer: COMMERCIAL

## 2023-08-03 NOTE — TELEPHONE ENCOUNTER
Pt calling stating for medicare reasons for her orthotics she needs more descriptive of reasoning for the order. Such as pt had a foot exam and has neuropathy and will need the orthotics.    Please see other orders where they state they need dotphrase diabeticfootexam or diabetic foot exam picklist in SOAPO.    Please advise,    Thanks!  Israel Vega RN   [facility]

## 2023-08-05 ENCOUNTER — HEALTH MAINTENANCE LETTER (OUTPATIENT)
Age: 58
End: 2023-08-05

## 2023-08-17 DIAGNOSIS — F41.1 GAD (GENERALIZED ANXIETY DISORDER): ICD-10-CM

## 2023-08-17 RX ORDER — CLONAZEPAM 0.5 MG/1
0.5 TABLET ORAL 2 TIMES DAILY PRN
Qty: 60 TABLET | Refills: 5 | Status: CANCELLED | OUTPATIENT
Start: 2023-08-17

## 2023-08-17 NOTE — TELEPHONE ENCOUNTER
Refill Request    Medication: clotrimazole 1% topical    [facility]   [address] [id number]    Last refill: ?

## 2023-08-24 ENCOUNTER — DOCUMENTATION ONLY (OUTPATIENT)
Dept: FAMILY MEDICINE | Facility: CLINIC | Age: 58
End: 2023-08-24
Payer: COMMERCIAL

## 2023-08-24 ENCOUNTER — TELEPHONE (OUTPATIENT)
Dept: FAMILY MEDICINE | Facility: CLINIC | Age: 58
End: 2023-08-24
Payer: COMMERCIAL

## 2023-08-24 DIAGNOSIS — Z79.4 TYPE 2 DIABETES MELLITUS WITH COMPLICATION, WITH LONG-TERM CURRENT USE OF INSULIN (H): ICD-10-CM

## 2023-08-24 DIAGNOSIS — E11.8 TYPE 2 DIABETES MELLITUS WITH COMPLICATION, WITH LONG-TERM CURRENT USE OF INSULIN (H): ICD-10-CM

## 2023-08-24 DIAGNOSIS — G63 POLYNEUROPATHY ASSOCIATED WITH UNDERLYING DISEASE (H): Primary | ICD-10-CM

## 2023-08-24 NOTE — TELEPHONE ENCOUNTER
Reason for Call:  Form, our goal is to have forms completed with 72 hours, however, some forms may require a visit or additional information.    Type of letter, form or note:  medical    Who is the form from?:  [employer] (if other please explain)    Where did the form come from: form was faxed in    What clinic location was the form placed at?: [facility]    Where the form was placed:   Box/Folder    What number is listed as a contact on the form?:   F [phone]         Additional comments:     Call taken on 8/24/2023 at 8:51 AM by Yane Nicole

## 2023-08-24 NOTE — PROGRESS NOTES
Medicare requires that the MD/DO treating the patient for diabetes answers all of the questions and signs the pended order for the patient to qualify for diabetic shoes. Once the order is completed, please route the encounter back to sender.    Sachin Bright

## 2023-09-07 ENCOUNTER — TELEPHONE (OUTPATIENT)
Dept: FAMILY MEDICINE | Facility: CLINIC | Age: 58
End: 2023-09-07
Payer: COMMERCIAL

## 2023-09-07 NOTE — TELEPHONE ENCOUNTER
Pt requesting Clotrimazole 1% topical sol not on current med list.   Thanks,   Candelario Sanchez RN  [facility]

## 2023-09-08 DIAGNOSIS — L85.3 DRY SKIN DERMATITIS: ICD-10-CM

## 2023-09-08 RX ORDER — AMMONIUM LACTATE 12 G/100G
LOTION TOPICAL PRN
Qty: 500 G | Refills: 11 | Status: SHIPPED | OUTPATIENT
Start: 2023-09-08 | End: 2023-09-21

## 2023-09-08 NOTE — TELEPHONE ENCOUNTER
"Requested Prescriptions   Pending Prescriptions Disp Refills    ammonium lactate (LAC-HYDRIN) 12 % external lotion 500 g 11     Sig: Apply topically as needed for dry skin       Miscellaneous Dermatologic Agents Passed - 9/8/2023 11:52 AM        Passed - Recent (12 mo) or future (30 days) visit within the authorizing provider's specialty     Patient has had an office visit with the authorizing provider or a provider within the authorizing providers department within the previous 12 mos or has a future within next 30 days. See \"Patient Info\" tab in inbasket, or \"Choose Columns\" in Meds & Orders section of the refill encounter.              Passed - Refill request is not for Imiquimod, 5-Fluorouracil, or Finasteride      If Imiquimod, 5-Fluorouracil, or Finasteride, may refill if indicated in progress notes.           Passed - Medication is active on med list        Passed - Patient is 24 mos old or older            Prescription approved per [facility] Refill Protocol.     Ryanne Mckeon RN  [facility]   "

## 2023-09-12 DIAGNOSIS — Z79.4 TYPE 2 DIABETES MELLITUS WITH COMPLICATION, WITH LONG-TERM CURRENT USE OF INSULIN (H): ICD-10-CM

## 2023-09-12 DIAGNOSIS — E11.8 TYPE 2 DIABETES MELLITUS WITH COMPLICATION, WITH LONG-TERM CURRENT USE OF INSULIN (H): ICD-10-CM

## 2023-09-12 RX ORDER — GABAPENTIN 600 MG/1
600 TABLET ORAL 3 TIMES DAILY
Qty: 90 TABLET | Refills: 3 | Status: SHIPPED | OUTPATIENT
Start: 2023-09-12 | End: 2024-06-27

## 2023-09-12 NOTE — TELEPHONE ENCOUNTER
Requested Prescriptions   Pending Prescriptions Disp Refills    gabapentin (NEURONTIN) 600 MG tablet 90 tablet 3     Sig: Take 1 tablet (600 mg) by mouth 3 times daily       There is no refill protocol information for this order         Routing refill request to provider for review/approval because:  Drug not on the [facility] refill protocol     Ryanne Mckeon RN  [facility]

## 2023-09-12 NOTE — TELEPHONE ENCOUNTER
Pending Prescriptions:                       Disp   Refills    gabapentin (NEURONTIN) 600 MG tablet      90 tab*3            Sig: Take 1 tablet (600 mg) by mouth 3 times daily

## 2023-09-21 ENCOUNTER — VIRTUAL VISIT (OUTPATIENT)
Dept: FAMILY MEDICINE | Facility: CLINIC | Age: 58
End: 2023-09-21
Payer: COMMERCIAL

## 2023-09-21 DIAGNOSIS — I83.11 VARICOSE VEINS OF BOTH LOWER EXTREMITIES WITH INFLAMMATION: ICD-10-CM

## 2023-09-21 DIAGNOSIS — L85.3 DRY SKIN DERMATITIS: ICD-10-CM

## 2023-09-21 DIAGNOSIS — I83.12 VARICOSE VEINS OF BOTH LOWER EXTREMITIES WITH INFLAMMATION: ICD-10-CM

## 2023-09-21 DIAGNOSIS — T75.3XXA SEVERE MOTION SICKNESS, INITIAL ENCOUNTER: Primary | ICD-10-CM

## 2023-09-21 PROCEDURE — 99214 OFFICE O/P EST MOD 30 MIN: CPT | Mod: 95 | Performed by: FAMILY MEDICINE

## 2023-09-21 RX ORDER — MECLIZINE HYDROCHLORIDE 25 MG/1
25 TABLET ORAL DAILY PRN
Qty: 30 TABLET | Refills: 1 | Status: SHIPPED | OUTPATIENT
Start: 2023-09-21 | End: 2024-08-06

## 2023-09-21 RX ORDER — AMMONIUM LACTATE 12 G/100G
LOTION TOPICAL PRN
Qty: 500 G | Refills: 11 | Status: SHIPPED | OUTPATIENT
Start: 2023-09-21 | End: 2023-12-20

## 2023-09-21 ASSESSMENT — ASTHMA QUESTIONNAIRES: ACT_TOTALSCORE: 14

## 2023-09-21 ASSESSMENT — PATIENT HEALTH QUESTIONNAIRE - PHQ9
10. IF YOU CHECKED OFF ANY PROBLEMS, HOW DIFFICULT HAVE THESE PROBLEMS MADE IT FOR YOU TO DO YOUR WORK, TAKE CARE OF THINGS AT HOME, OR GET ALONG WITH OTHER PEOPLE: SOMEWHAT DIFFICULT
SUM OF ALL RESPONSES TO PHQ QUESTIONS 1-9: 18
SUM OF ALL RESPONSES TO PHQ QUESTIONS 1-9: 18

## 2023-09-21 NOTE — PROGRESS NOTES
Gillian is a 57 year old who is being evaluated via a billable telephone visit.      What phone number would you like to be contacted at? Home   How would you like to obtain your AVS? Katarina    Distant Location (provider location):  On-site    Assessment & Plan     Dry skin dermatitis  - ammonium lactate (LAC-HYDRIN) 12 % external lotion; Apply topically as needed for dry skin    Severe motion sickness, initial encounter  - meclizine (ANTIVERT) 25 MG tablet; Take 1 tablet (25 mg) by mouth daily as needed for dizziness (or motion sickness)    Varicose veins of both lower extremities with inflammation  - Vascular Surgery Referral; Future      Jose Sanchez DO  [facility]   Gillian is a 57 year old, presenting for the following health issues:  Recheck Medication    History of Present Illness       Reason for visit:  Refill and vericose veins    She eats 2-3 servings of fruits and vegetables daily.She consumes 1 sweetened beverage(s) daily.She exercises with enough effort to increase her heart rate 30 to 60 minutes per day.  She exercises with enough effort to increase her heart rate 3 or less days per week. She is missing 2 dose(s) of medications per week.  She is not taking prescribed medications regularly due to other.       Needs refills of lac hydrin     Working well on her dry  feet    Motion sickness   Getting worse   Riding in the taxi to appointments is hard   When in the back seat it is a lot worse   Would like meclizine     Working on getting the omnipod for  insulin   Sees endocrinology     Trying to increase her distance walking   Asthma is a big bother   Did 2 miles today  Had to stop several times during the walk   Using her inhaler a lot     Varicose veins  Itchy, a little pain   Parent bad varicose veins   Uncomfortable     A lot of dental pain   Is going to have them extracted and dentures           Objective           Vitals:  No vitals were obtained today due to  virtual visit.    Physical Exam   healthy, alert, and no distress  PSYCH: Alert and oriented times 3; coherent speech, normal   rate and volume, able to articulate logical thoughts, able   to abstract reason, no tangential thoughts, no hallucinations   or delusions  Her affect is normal  RESP: No cough, no audible wheezing, able to talk in full sentences  Remainder of exam unable to be completed due to telephone visits        Phone call duration: 29 minutes

## 2023-09-25 ENCOUNTER — TELEPHONE (OUTPATIENT)
Dept: VASCULAR SURGERY | Facility: CLINIC | Age: 58
End: 2023-09-25
Payer: COMMERCIAL

## 2023-09-25 NOTE — TELEPHONE ENCOUNTER
Called and LVM for Pt regarding referral. Clinic telephone provided requesting a return call to clinic. MyChart also sent.      Vein treatment/consults on hold at [place] location. Pt will need to be seen at alternate location.     [facility]: [phone]  [facility]: [phone]  [facility]: [phone]  [facility]: [phone]    Pt returned call to clinic. Reviewed the above. She noted she would prefer [facility]. Provided her clinic telephone. Referral transferred. Pt verbalized understanding, agreed to current plan and denied any further questions.    Temitope Felix LPN

## 2023-10-12 ENCOUNTER — VIRTUAL VISIT (OUTPATIENT)
Dept: PSYCHOLOGY | Facility: CLINIC | Age: 58
End: 2023-10-12
Payer: COMMERCIAL

## 2023-10-12 DIAGNOSIS — F41.1 GAD (GENERALIZED ANXIETY DISORDER): Primary | ICD-10-CM

## 2023-10-12 PROCEDURE — 90837 PSYTX W PT 60 MINUTES: CPT | Mod: VID

## 2023-10-12 NOTE — PROGRESS NOTES
[facility]   Mental Health & Addiction Services     Progress Note - Initial Visit    Patient  Name:  Gillian Camara Date: 10/12/2023         Service Type: Individual     Visit Start Time: 2:05 pm  Visit End Time: 3:00 pm    Visit #: 2    Attendees: Client attended alone    Service Modality:  Video Visit:      Provider verified identity through the following two step process.  Patient provided:  Patient address    Telemedicine Visit: The patient's condition can be safely assessed and treated via synchronous audio and visual telemedicine encounter.      Reason for Telemedicine Visit: Patient convenience (e.g. access to timely appointments / distance to available provider)    Originating Site (Patient Location): Patient's home    Distant Site (Provider Location): Provider Remote Setting- Home Office    Consent:  The patient/guardian has verbally consented to: the potential risks and benefits of telemedicine (video visit) versus in person care; bill my insurance or make self-payment for services provided; and responsibility for payment of non-covered services.     Patient would like the video invitation sent by:  My Chart    Mode of Communication:  Video Conference via [employer]well    Distant Location (Provider):  Off-site    As the provider I attest to compliance with applicable laws and regulations related to telemedicine.       DATA:  Extended Session (53+ minutes): PROLONGED SERVICE IN THE OUTPATIENT SETTING REQUIRING DIRECT (FACE-TO-FACE) PATIENT CONTACT BEYOND THE USUAL SERVICE:    - Longer session due to limited access to mental health appointments and necessity to address patient's distress / complexity  Interactive Complexity: No  Crisis: No      Presenting Concerns/  Current Stressors:  Distress over Kp/Ham[place] conflict, engaging in media, Hoahaoism prayer circles, candle lighting, local community connection. Irritability over noise-outside in the hallway.  Medical comorbidities, hx of concussion,  self harming/cutting behaviors, left employment, secured disability in 2021.Twin sister who was a support to patient regarding securing disability status, had Covid and passed away. From a family of 13 children (1 brother passed in infancy), close family.      ASSESSMENT:  Mental Status Assessment:  Appearance:   Appropriate   Eye Contact:   Good   Psychomotor Behavior: Normal   Attitude:   Cooperative   Orientation:   All  Speech   Rate / Production: Emotional   Volume:  Normal   Mood:    Depressed   Affect:    Tearful  Thought Content:  Clear   Thought Form:  Coherent   Insight:    Spiritual insight      Safety Issues and Plan for Safety and Risk Management:   [place] Suicide Severity Rating Scale (Lifetime/Recent)      7/27/2023    10:00 AM   [place] Suicide Severity Rating (Lifetime/Recent)   Q1 Wished to be Dead (Past Month) no   Q2 Suicidal Thoughts (Past Month) no   Q3 Suicidal Thought Method no   Q4 Suicidal Intent without Specific Plan no   Q5 Suicide Intent with Specific Plan no   Q6 Suicide Behavior (Lifetime) no   Level of Risk per Screen low risk     Patient denies current fears or concerns for personal safety.  Patient denies current or recent suicidal ideation or behaviors.  Patient denies current or recent homicidal ideation or behaviors.  Patient denies current or recent self injurious behavior or ideation. hx of self harming  Patient denies other safety concerns.  Recommended that patient call 911 or go to the local ED should there be a change in any of these risk factors.  Patient reports there are no firearms in the house.     Diagnostic Criteria:    JAMES: feeling nervous anxious, on edge; excessive worry, trouble relaxing, restless, irritability, fear, uncontrolled worrying, worrying too much about different things    DSM5 Diagnoses: (Sustained by DSM5 Criteria Listed Above)  Diagnoses: 300.02 (F41.1) Generalized Anxiety Disorder  Psychosocial & Contextual Factors: trauma history, on  disability,   WHODAS 2.0 (12 item):        No data to display              Intervention:   Psychodynamic- Patient processed internal experiences  and surfaced coping skills - activities of enjoyment including cooking/baking, genealogy, learning Vietnamese . Reflected on difficulty with Kp-Hamas conflict. Surfaced desire to lead a [email or web address] group with neighbors.  Taught DBT stop skill and practice skill  Collateral Reports Completed:  Not Applicable      PLAN: (Homework, other):  1. Provider will continue Diagnostic Assessment.  Patient was given the following to do until next session:  engage in hobbies.   2. Provider recommended the following referrals: anxiety group.      3.  Suicide Risk and Safety Concerns were assessed for Gillian Camara.    Patient meets the following risk assessment and triage: Patient denied any current/recent/lifetime history of suicidal ideation and/or behaviors.  No safety plan indicated at this time.       Cora Garcia, ASHWIN  10/12/23

## 2023-10-14 ENCOUNTER — HEALTH MAINTENANCE LETTER (OUTPATIENT)
Age: 58
End: 2023-10-14

## 2023-10-17 ENCOUNTER — OFFICE VISIT (OUTPATIENT)
Dept: VASCULAR SURGERY | Facility: CLINIC | Age: 58
End: 2023-10-17
Attending: FAMILY MEDICINE
Payer: COMMERCIAL

## 2023-10-17 DIAGNOSIS — I83.11 VARICOSE VEINS OF BOTH LOWER EXTREMITIES WITH INFLAMMATION: ICD-10-CM

## 2023-10-17 DIAGNOSIS — I83.12 VARICOSE VEINS OF BOTH LOWER EXTREMITIES WITH INFLAMMATION: ICD-10-CM

## 2023-10-17 DIAGNOSIS — I83.813 VARICOSE VEINS OF BILATERAL LOWER EXTREMITIES WITH PAIN: Primary | ICD-10-CM

## 2023-10-17 DIAGNOSIS — I83.893 VARICOSE VEINS OF LEG WITH EDEMA, BILATERAL: ICD-10-CM

## 2023-10-17 DIAGNOSIS — I87.2 STASIS DERMATITIS OF BOTH LEGS: ICD-10-CM

## 2023-10-17 PROCEDURE — 99204 OFFICE O/P NEW MOD 45 MIN: CPT | Performed by: SPECIALIST

## 2023-10-17 NOTE — PROGRESS NOTES
Consult requested by Dr. Sanchez    Reason for consultation:      HPI:  Patient is a 58-year-old female with multiple medical problems including diabetes with neuropathy and arthritis.  She developed varicose veins in her 20s and they progressively worsened since then.  She now reports pain achiness and swelling worse at the end of the day.  She also has bad neuropathy.  She has been wearing compression socks for the past 2 years.  Initially they helped but now she has symptoms despite compression.  Her main complaint is the pain in her veins worse at the end of the day.  There is a family history of varicose veins in her mother.  He does not smoke.  She denies any leg trauma, DVT, superficial phlebitis or nonhealing wounds.  Her left leg is worse than her right.  She now presents to me for evaluation of her varicose veins and pain.    Past Medical History:   Diagnosis Date    Amblyopia    Diabetes mellitus  Neuropathy  Severe asthma  Hypertension  GERD    .    No past surgical history on file.  Right elbow tendon repair.    Current Outpatient Medications   Medication    albuterol (PROAIR HFA/PROVENTIL HFA/VENTOLIN HFA) 108 (90 Base) MCG/ACT inhaler    ammonium lactate (LAC-HYDRIN) 12 % external lotion    atorvastatin (LIPITOR) 80 MG tablet    clonazePAM (KLONOPIN) 0.5 MG tablet    ezetimibe (ZETIA) 10 MG tablet    famotidine (PEPCID) 20 MG tablet    fluticasone (ARNUITY ELLIPTA) 200 MCG/ACT inhaler    fluticasone (FLONASE) 50 MCG/ACT nasal spray    gabapentin (NEURONTIN) 600 MG tablet    hydrochlorothiazide (MICROZIDE) 12.5 MG capsule    insulin aspart (NOVOLOG FLEXPEN) 100 UNIT/ML pen    insulin glargine (LANTUS SOLOSTAR) 100 UNIT/ML pen    insulin pen needle (32G X 6 MM) 32G X 6 MM miscellaneous    liraglutide (VICTOZA PEN) 18 MG/3ML solution    losartan (COZAAR) 50 MG tablet    meclizine (ANTIVERT) 25 MG tablet    metFORMIN (GLUCOPHAGE XR) 500 MG 24 hr tablet    omeprazole (PRILOSEC) 20 MG DR capsule     pramipexole (MIRAPEX) 0.25 MG tablet    sertraline (ZOLOFT) 100 MG tablet    triamcinolone (NASACORT) 55 MCG/ACT nasal aerosol     No current facility-administered medications for this visit.          Allergies   Allergen Reactions    Lisinopril Cough     Social History     Socioeconomic History    Marital status:      Spouse name: Not on file    Number of children: Not on file    Years of education: Not on file    Highest education level: Not on file   Occupational History    Not on file   Tobacco Use    Smoking status: Never     Passive exposure: Never    Smokeless tobacco: Never   Vaping Use    Vaping Use: Never used   Substance and Sexual Activity    Alcohol use: Not on file    Drug use: Not on file    Sexual activity: Not on file   Other Topics Concern    Not on file   Social History Narrative    Not on file     Social Determinants of Health     Financial Resource Strain: Low Risk  (9/21/2023)    Financial Resource Strain     Within the past 12 months, have you or your family members you live with been unable to get utilities (heat, electricity) when it was really needed?: No   Food Insecurity: Low Risk  (9/21/2023)    Food Insecurity     Within the past 12 months, did you worry that your food would run out before you got money to buy more?: No     Within the past 12 months, did the food you bought just not last and you didn t have money to get more?: No   Transportation Needs: Low Risk  (9/21/2023)    Transportation Needs     Within the past 12 months, has lack of transportation kept you from medical appointments, getting your medicines, non-medical meetings or appointments, work, or from getting things that you need?: No   Physical Activity: Not on file   Stress: Not on file   Social Connections: Not on file   Interpersonal Safety: Not on file   Housing Stability: Low Risk  (9/21/2023)    Housing Stability     Do you have housing? : Yes     Are you worried about losing your housing?: No     Family  History   Problem Relation Age of Onset    Diabetes Sister     Retinitis pigmentosa Sister         ROS: 10 point ROS neg other than the symptoms noted above in the HPI.    PE:  B/P: Data Unavailable, T: Data Unavailable, P: Data Unavailable, R: Data Unavailable  General: well developed, well nourished F who appears older than their stated age  HEENT: NC/AT, EOMI, (-)icterus, (-)injection  Neck: Supple, No JVD  Chest: CTA  Heart: S1, S2, (-)m/r/g  Abd: Soft, non tender, non distended, non tender, no masses  Ext; Warm, (+) DP pulses bilaterally.  Trace edema.  Bilateral stasis changes.  Bilateral varicose veins left greater than right.  Psych: AAOx3  Neuro: No focal deficits      Impression/plan:  This is a 58-year lady presenting with bilateral leg pain and swelling that I feel is multifactorial in origin.  She is a CEAP 4 bilaterally.  Her left leg is worse in symptomatology than her right.  She is symptomatic despite compression therapy.  I feel a portion of her symptoms are related to her diabetes and severe neuropathy As well as her underlying arthritis.  I do feel her veins are symptomatic as well.  I discussed the role of ultrasound as well as compression therapy and the patient expressed understanding.  After discussion the patient the plan at this time obtain an ultrasound of both her lower extremities for reflux and proceed based on his findings.  She will follow with me after the ultrasound.    Jose Mcconnell MD, FACS

## 2023-10-17 NOTE — LETTER
10/17/2023         RE: Gillian Camara  [address]  [place] [address]        Dear Colleague,    Thank you for referring your patient, Gillian Camara, to the [facility] [place]. Please see a copy of my visit note below.    Consult requested by Dr. Sanchez    Reason for consultation:      HPI:  Patient is a 58-year-old female with multiple medical problems including diabetes with neuropathy and arthritis.  She developed varicose veins in her 20s and they progressively worsened since then.  She now reports pain achiness and swelling worse at the end of the day.  She also has bad neuropathy.  She has been wearing compression socks for the past 2 years.  Initially they helped but now she has symptoms despite compression.  Her main complaint is the pain in her veins worse at the end of the day.  There is a family history of varicose veins in her mother.  He does not smoke.  She denies any leg trauma, DVT, superficial phlebitis or nonhealing wounds.  Her left leg is worse than her right.  She now presents to me for evaluation of her varicose veins and pain.    Past Medical History:   Diagnosis Date     Amblyopia    Diabetes mellitus  Neuropathy  Severe asthma  Hypertension  GERD    .    No past surgical history on file.  Right elbow tendon repair.    Current Outpatient Medications   Medication     albuterol (PROAIR HFA/PROVENTIL HFA/VENTOLIN HFA) 108 (90 Base) MCG/ACT inhaler     ammonium lactate (LAC-HYDRIN) 12 % external lotion     atorvastatin (LIPITOR) 80 MG tablet     clonazePAM (KLONOPIN) 0.5 MG tablet     ezetimibe (ZETIA) 10 MG tablet     famotidine (PEPCID) 20 MG tablet     fluticasone (ARNUITY ELLIPTA) 200 MCG/ACT inhaler     fluticasone (FLONASE) 50 MCG/ACT nasal spray     gabapentin (NEURONTIN) 600 MG tablet     hydrochlorothiazide (MICROZIDE) 12.5 MG capsule     insulin aspart (NOVOLOG FLEXPEN) 100 UNIT/ML pen     insulin glargine (LANTUS SOLOSTAR) 100 UNIT/ML pen      insulin pen needle (32G X 6 MM) 32G X 6 MM miscellaneous     liraglutide (VICTOZA PEN) 18 MG/3ML solution     losartan (COZAAR) 50 MG tablet     meclizine (ANTIVERT) 25 MG tablet     metFORMIN (GLUCOPHAGE XR) 500 MG 24 hr tablet     omeprazole (PRILOSEC) 20 MG DR capsule     pramipexole (MIRAPEX) 0.25 MG tablet     sertraline (ZOLOFT) 100 MG tablet     triamcinolone (NASACORT) 55 MCG/ACT nasal aerosol     No current facility-administered medications for this visit.          Allergies   Allergen Reactions     Lisinopril Cough     Social History     Socioeconomic History     Marital status:      Spouse name: Not on file     Number of children: Not on file     Years of education: Not on file     Highest education level: Not on file   Occupational History     Not on file   Tobacco Use     Smoking status: Never     Passive exposure: Never     Smokeless tobacco: Never   Vaping Use     Vaping Use: Never used   Substance and Sexual Activity     Alcohol use: Not on file     Drug use: Not on file     Sexual activity: Not on file   Other Topics Concern     Not on file   Social History Narrative     Not on file     Social Determinants of Health     Financial Resource Strain: Low Risk  (9/21/2023)    Financial Resource Strain      Within the past 12 months, have you or your family members you live with been unable to get utilities (heat, electricity) when it was really needed?: No   Food Insecurity: Low Risk  (9/21/2023)    Food Insecurity      Within the past 12 months, did you worry that your food would run out before you got money to buy more?: No      Within the past 12 months, did the food you bought just not last and you didn t have money to get more?: No   Transportation Needs: Low Risk  (9/21/2023)    Transportation Needs      Within the past 12 months, has lack of transportation kept you from medical appointments, getting your medicines, non-medical meetings or appointments, work, or from getting things that you  need?: No   Physical Activity: Not on file   Stress: Not on file   Social Connections: Not on file   Interpersonal Safety: Not on file   Housing Stability: Low Risk  (9/21/2023)    Housing Stability      Do you have housing? : Yes      Are you worried about losing your housing?: No     Family History   Problem Relation Age of Onset     Diabetes Sister      Retinitis pigmentosa Sister         ROS: 10 point ROS neg other than the symptoms noted above in the HPI.    PE:  B/P: Data Unavailable, T: Data Unavailable, P: Data Unavailable, R: Data Unavailable  General: well developed, well nourished F who appears older than their stated age  HEENT: NC/AT, EOMI, (-)icterus, (-)injection  Neck: Supple, No JVD  Chest: CTA  Heart: S1, S2, (-)m/r/g  Abd: Soft, non tender, non distended, non tender, no masses  Ext; Warm, (+) DP pulses bilaterally.  Trace edema.  Bilateral stasis changes.  Bilateral varicose veins left greater than right.  Psych: AAOx3  Neuro: No focal deficits      Impression/plan:  This is a 58-year lady presenting with bilateral leg pain and swelling that I feel is multifactorial in origin.  She is a CEAP 4 bilaterally.  Her left leg is worse in symptomatology than her right.  She is symptomatic despite compression therapy.  I feel a portion of her symptoms are related to her diabetes and severe neuropathy As well as her underlying arthritis.  I do feel her veins are symptomatic as well.  I discussed the role of ultrasound as well as compression therapy and the patient expressed understanding.  After discussion the patient the plan at this time obtain an ultrasound of both her lower extremities for reflux and proceed based on his findings.  She will follow with me after the ultrasound.    Jose Mcconnell MD, FACS      Again, thank you for allowing me to participate in the care of your patient.        Sincerely,        Jose Mcconnell MD

## 2023-10-17 NOTE — NURSING NOTE
Patient Reported symptoms:    Right leg   Heaviness Most of the time   Achiness Most of the time   Swelling A good bit of the time   Throbbing A good bit of the time   Itching A good bit of the time   Appearance Extremely noticeable   Impact on work/activities Symptoms but full able to participate    Left Leg   Heaviness Most of the time   Achiness Most of the time   Swelling A good bit of the time   Throbbing A good bit of the time   Itching A good bit of the time   Appearance Extremely noticeable   Impact on work/activities Symptoms but full able to participate

## 2023-11-07 ENCOUNTER — ANCILLARY PROCEDURE (OUTPATIENT)
Dept: ULTRASOUND IMAGING | Facility: CLINIC | Age: 58
End: 2023-11-07
Payer: COMMERCIAL

## 2023-11-07 DIAGNOSIS — I83.11 VARICOSE VEINS OF BOTH LOWER EXTREMITIES WITH INFLAMMATION: ICD-10-CM

## 2023-11-07 DIAGNOSIS — I83.12 VARICOSE VEINS OF BOTH LOWER EXTREMITIES WITH INFLAMMATION: ICD-10-CM

## 2023-11-07 PROCEDURE — 93970 EXTREMITY STUDY: CPT | Performed by: SPECIALIST

## 2023-11-15 ENCOUNTER — MEDICAL CORRESPONDENCE (OUTPATIENT)
Dept: HEALTH INFORMATION MANAGEMENT | Facility: CLINIC | Age: 58
End: 2023-11-15

## 2023-11-15 ENCOUNTER — VIRTUAL VISIT (OUTPATIENT)
Dept: VASCULAR SURGERY | Facility: CLINIC | Age: 58
End: 2023-11-15
Payer: COMMERCIAL

## 2023-11-15 DIAGNOSIS — I83.813 VARICOSE VEINS OF BILATERAL LOWER EXTREMITIES WITH PAIN: Primary | ICD-10-CM

## 2023-11-15 DIAGNOSIS — I87.2 STASIS DERMATITIS OF BOTH LEGS: ICD-10-CM

## 2023-11-15 DIAGNOSIS — I83.893 VARICOSE VEINS OF LEG WITH EDEMA, BILATERAL: ICD-10-CM

## 2023-11-15 PROCEDURE — 99213 OFFICE O/P EST LOW 20 MIN: CPT | Mod: VID | Performed by: SPECIALIST

## 2023-11-15 NOTE — PROGRESS NOTES
Gillian is a 58 year old who is being evaluated via a billable video visit.      How would you like to obtain your AVS? MyChart  If the video visit is dropped, the invitation should be resent by: Text to cell phone: [phone]  Will anyone else be joining your video visit? No        Video-Visit Details    Type of service:  Video Visit   Video Start Time: 3:30 PM  Video End Time:4:00 PM    Follow-up of ultrasound    Subjective:  Patient is a 58-year-old female with multiple medical problems including diabetes with neuropathy and arthritis.  She developed varicose veins in her 20s and they progressively worsened since then.  She now reports pain achiness and swelling worse at the end of the day.  She also has bad neuropathy.  She has been wearing compression socks for the past 2 years.  Initially they helped but now she has symptoms despite compression.  Her main complaint is the pain in her veins worse at the end of the day.  There is a family history of varicose veins in her mother.  He does not smoke.  She denies any leg trauma, DVT, superficial phlebitis or nonhealing wounds.  Her left leg is worse than her right.     She had ultrasound which she now follows up.    Objective:  Ext; Warm, (+) DP pulses bilaterally.  Trace edema.  Bilateral stasis changes.  Bilateral varicose veins left greater than right.    US -  Name:  Gillian Camara                                                    Patient ID: [id number]  Date: 2023                                                        : 1965  Sex: female                                                                 Examined by: VERO Yeh RVT  Age:  58 year old                                                         Reading MD: Jose Mcconnell MD     INDICATION:  Bilateral varicose vein with pain and swelling     EXAM TYPE  BILATERAL LOWER EXTREMITY VENOUS DUPLEX FOR VENOUS INSUFFICIENCY  TECHNICAL SUMMARY     A duplex ultrasound study using color flow was  performed, to evaluate the bilateral lower extremity veins for valvular incompetence with the patient in a steep reversed trendelenberg.      RIGHT:     The deep veins demonstrate phasic flow, compress and respond to augmentations.  There is no reflux or DVT.       The GSV demonstrates phasic flow, compresses and responds to augmentations from the saphenofemoral junction to the ankle with no evidence of reflux or thrombus. The great saphenous vein measures 8.6 mm at the saphenofemoral junction, 2.5 mm at the proximal thigh and 2.5 mm at the knee.      The AASV is competent( 3.2 mm) draining into the saphenofemoral junction.      The Giacomini vein is competent ( 1.4 mm) communicating with the small saphenous vein at the knee level.      The SSV demonstrates phasic flow, compresses and responds to augmentations from the popliteal space to the ankle.  No reflux or thrombus is seen. The saphenopopliteal junction is absent.The SSV gives rise to a varicose branch measuring 1.3 mm off the Distal Calf that courses Medial with a reflux time of 693 milliseconds.       Perforators: there is no evidence of incompetent  veins at any level.      LEFT:     The deep veins demonstrate phasic flow, compress and respond to augmentations.  There is no reflux or DVT.       The GSV demonstrates phasic flow, compresses and responds to augmentations from the saphenofemoral junction to the mid thigh and from the proximal calf to ankle with no evidence of thrombus. The GSV is not seen in the distal thigh and knee. The great saphenous vein measures 6.7 mm at the saphenofemoral junction, 2.5 mm at the proximal thigh and is not seen at the knee. . The GSV is incompetent at the SFJ and from the mid calf to ankle, with the greatest reflux time of 2376 milliseconds.  The GSV gives rise to multiple incompetent varicose veins, the largest measures 3.6 mm off the distal thigh coursing anteromedial with a reflux time of 4429 milliseconds.       The AASV is competent ( 3.9 mm) draining into the saphenofemoral junction.       The Giacomini vein is competent( 1.7 mm) communicating with the small saphenous vein at the knee level.      The SSV demonstrates phasic flow, compresses and responds to augmentations from the popliteal space to the ankle.  No reflux or thrombus is seen. The saphenopopliteal junction is absent.      Perforators: there is no evidence of incompetent  veins at any level.      FINAL SUMMARY:     Right lower extremity:  1.  There is no DVT nor deep system incompetence.  2.  The GSV AASV Giacomini and SSV are competent.        Left lower extremity:  1.  There is no DVT nor deep system incompetence.  2.  The GSV is incompetent at the SFJ and mid calf to ankle with a segment missing distal thigh to knee.  The GSV gives rise to multiple incompetent varicose branches.  3.  The remainder the superficial system is competent.     Incompetence Criteria: greater than 500 milliseconds reflux in the superficial and  veins and greater than 1000 milliseconds reflux in the deep veins.         Study somewhat limited due to thin legs and superficial veins.     Assessment /plan:  This is a 58-year lady presenting with bilateral leg pain and swelling that I feel is multifactorial in origin.  She is a CEAP 4 bilaterally.  Her left leg is worse in symptomatology than her right.  She is symptomatic despite compression therapy.  I feel a portion of her symptoms are related to her diabetes and severe neuropathy As well as her underlying arthritis.  I do feel her veins are symptomatic as well.      Ultrasound did not reveal any significant superficial system reflux.  Based on her ultrasound she is a candidate for bilateral stab phlebectomy.  Patient wishes to proceed.   The procedure, risks, benefits, and alternatives were discussed and the patient agrees to proceed.      Jose Mcconnell MD, FACS    Originating Location (pt. Location):  Home    Distant Location (provider location):  On-site  Platform used for Video Visit: John

## 2023-11-15 NOTE — LETTER
11/15/2023         RE: Gillian Camara  [address]  [facility] [address]        Dear Colleague,    Thank you for referring your patient, Gillian Camara, to the [facility] [place]. Please see a copy of my visit note below.    Gillian is a 58 year old who is being evaluated via a billable video visit.      How would you like to obtain your AVS? MyChart  If the video visit is dropped, the invitation should be resent by: Text to cell phone: [phone]  Will anyone else be joining your video visit? No        Video-Visit Details    Type of service:  Video Visit   Video Start Time: 3:30 PM  Video End Time:4:00 PM    Follow-up of ultrasound    Subjective:  Patient is a 58-year-old female with multiple medical problems including diabetes with neuropathy and arthritis.  She developed varicose veins in her 20s and they progressively worsened since then.  She now reports pain achiness and swelling worse at the end of the day.  She also has bad neuropathy.  She has been wearing compression socks for the past 2 years.  Initially they helped but now she has symptoms despite compression.  Her main complaint is the pain in her veins worse at the end of the day.  There is a family history of varicose veins in her mother.  He does not smoke.  She denies any leg trauma, DVT, superficial phlebitis or nonhealing wounds.  Her left leg is worse than her right.     She had ultrasound which she now follows up.    Objective:  Ext; Warm, (+) DP pulses bilaterally.  Trace edema.  Bilateral stasis changes.  Bilateral varicose veins left greater than right.    US -  Name:  Gillian Camara                                                    Patient ID: [id number]  Date: 2023                                                        : 1965  Sex: female                                                                 Examined by: VERO Yeh RVT  Age:  58 year old                                                          Reading MD: Jose Mcconnell MD     INDICATION:  Bilateral varicose vein with pain and swelling     EXAM TYPE  BILATERAL LOWER EXTREMITY VENOUS DUPLEX FOR VENOUS INSUFFICIENCY  TECHNICAL SUMMARY     A duplex ultrasound study using color flow was performed, to evaluate the bilateral lower extremity veins for valvular incompetence with the patient in a steep reversed trendelenberg.      RIGHT:     The deep veins demonstrate phasic flow, compress and respond to augmentations.  There is no reflux or DVT.       The GSV demonstrates phasic flow, compresses and responds to augmentations from the saphenofemoral junction to the ankle with no evidence of reflux or thrombus. The great saphenous vein measures 8.6 mm at the saphenofemoral junction, 2.5 mm at the proximal thigh and 2.5 mm at the knee.      The AASV is competent( 3.2 mm) draining into the saphenofemoral junction.      The Giacomini vein is competent ( 1.4 mm) communicating with the small saphenous vein at the knee level.      The SSV demonstrates phasic flow, compresses and responds to augmentations from the popliteal space to the ankle.  No reflux or thrombus is seen. The saphenopopliteal junction is absent.The SSV gives rise to a varicose branch measuring 1.3 mm off the Distal Calf that courses Medial with a reflux time of 693 milliseconds.       Perforators: there is no evidence of incompetent  veins at any level.      LEFT:     The deep veins demonstrate phasic flow, compress and respond to augmentations.  There is no reflux or DVT.       The GSV demonstrates phasic flow, compresses and responds to augmentations from the saphenofemoral junction to the mid thigh and from the proximal calf to ankle with no evidence of thrombus. The GSV is not seen in the distal thigh and knee. The great saphenous vein measures 6.7 mm at the saphenofemoral junction, 2.5 mm at the proximal thigh and is not seen at the knee. . The GSV is incompetent at  the SFJ and from the mid calf to ankle, with the greatest reflux time of 2376 milliseconds.  The GSV gives rise to multiple incompetent varicose veins, the largest measures 3.6 mm off the distal thigh coursing anteromedial with a reflux time of 4429 milliseconds.      The AASV is competent ( 3.9 mm) draining into the saphenofemoral junction.       The Giacomini vein is competent( 1.7 mm) communicating with the small saphenous vein at the knee level.      The SSV demonstrates phasic flow, compresses and responds to augmentations from the popliteal space to the ankle.  No reflux or thrombus is seen. The saphenopopliteal junction is absent.      Perforators: there is no evidence of incompetent  veins at any level.      FINAL SUMMARY:     Right lower extremity:  1.  There is no DVT nor deep system incompetence.  2.  The GSV AASV Giacomini and SSV are competent.        Left lower extremity:  1.  There is no DVT nor deep system incompetence.  2.  The GSV is incompetent at the SFJ and mid calf to ankle with a segment missing distal thigh to knee.  The GSV gives rise to multiple incompetent varicose branches.  3.  The remainder the superficial system is competent.     Incompetence Criteria: greater than 500 milliseconds reflux in the superficial and  veins and greater than 1000 milliseconds reflux in the deep veins.         Study somewhat limited due to thin legs and superficial veins.     Assessment /plan:  This is a 58-year lady presenting with bilateral leg pain and swelling that I feel is multifactorial in origin.  She is a CEAP 4 bilaterally.  Her left leg is worse in symptomatology than her right.  She is symptomatic despite compression therapy.  I feel a portion of her symptoms are related to her diabetes and severe neuropathy As well as her underlying arthritis.  I do feel her veins are symptomatic as well.      Ultrasound did not reveal any significant superficial system reflux.  Based on her  ultrasound she is a candidate for bilateral stab phlebectomy.  Patient wishes to proceed.   The procedure, risks, benefits, and alternatives were discussed and the patient agrees to proceed.      Jose Mcconnell MD, FACS    Originating Location (pt. Location): Home    Distant Location (provider location):  On-site  Platform used for Video Visit: John      Again, thank you for allowing me to participate in the care of your patient.        Sincerely,        Jose Mcconnell MD

## 2023-11-16 NOTE — PROGRESS NOTES
November 16, 2023    Vein Procedure Recommendation    Called and spoke with patient and her . Patient just had dental work done and was hard to understand.  verbalized understanding of information and said he would have the patient check her mychart.     Dr. Mcconnell has recommended patient to have the following vein procedure(s):     1. Bilateral leg  20-30 Phlebectomies (medically necessary)      Patient Pre-op Questions:  Preferred Pharmacy: [facility] in [place]  Anticoagulant/ASA: No  Artificial Joint or Heart Valve:  No  Open ulcer:  No  Sedation nausea: No    Explained to patient that compression hose are not required post procedure. Did provide patient with information on how to obtain hose.     Entered in patient's After Visit Summary (viewable in D1G) written procedure instructions to review on her own (see After Visit Summary).    Next steps:    Insurance Submission  Informed patient this process could take up to 14 business days, but once approved, the patient will be contacted by our surgery scheduler to schedule the above procedure. Gave patient our surgery scheduler's information.    Patient is in agreement with all of the above and has no further questions at this time.    Shoshana Prado RN  [facility]  Vein Clinic

## 2023-11-16 NOTE — PATIENT INSTRUCTIONS
Pre-Procedure Instructions:                       Phlebectomies  You are having Phlebectomies, where one or more of your veins are removed.  Insurance  If your procedure was deemed medically necessary, we will call your insurance company to verify benefits.    Your Current Medications and Allergies  To reduce bruising, please do not take aspirin-type medications (Motrin, Aleve, Ibuprofen, Advil, etc.) for three days before your procedure. You may take Tylenol if you need a pain reliever.  Are you on blood thinner medications? (Plavix, Coumadin, Eliquis, Xarelto) Please discuss this with your surgeon. You may resume taking your blood thinner medication after your procedure.  Are you sensitive to latex or adhesives used for fake fingernails? Please let us know!    Driving Escort and   Please arrange to have a trusted adult (18 years old or older) drive you to and from the clinic.  For your safety, we recommend you have a trusted adult stay with you until the next morning.    Your Health  If you have a change in your health before the procedure, contact our office immediately.  (For example: cold symptoms, cough, urinary tract infection, fever, flu symptoms.)  A pre-procedure physical is not required.     Note  It is sometimes necessary to adjust the procedure schedule due to emergencies. We greatly appreciate your flexibility and understanding in this matter.        _____________________________  ________________________    Check List: The Morning of Your Procedure  ___1. Please do not put anything on your leg(s) or shave the day of your procedure.  ___2. You may take your normal medications the day of your procedure.  ___3. It is recommended you eat a light breakfast or lunch the day of your procedure.  ___4. Wear comfortable loose-fitting clothing and wide-fitting shoes (i.e. tennis shoes, slip-ons).  ___5. Please arrive at our clinic at the specified time given by the nurse.  ___6. You will sign an  affirmation of informed consent.  ___7. Bring your pre-procedure sedation medication (lorazepam and clonidine) with you to the clinic. One hour             before your procedure, you will be instructed to take these medications. The lorazepam (Ativan) lowers             anxiety and sedates you; the clonidine makes the lorazepam more effective. Everyone's body processes             these medications differently. Therefore, reactions to these medications vary. Some people stay awake             and some people sleep through the whole procedure. You may not remember everything about the             procedure or the day. You do not want to make any big decisions for the rest of the day.              The Day of Your Procedure:                                  Phlebectomies  In the Exam Room  A nurse will bring you back to an exam room with your family member or friend. This is when your informed consent will be signed, and you will take your pre-procedure medications.  You will be asked to remove everything from the waist down, including undergarments. You will then put on a hospital gown or shorts and blue booties.  Your surgeon will come in to answer any questions and crystal any bulging varicose veins to be removed.  You will be taken to the restroom to empty your bladder before going into the procedure room.    In the Procedure Room  You will be escorted to the procedure room. You will lie on a procedure table covered with a sheet or blanket.  A nurse will put a blood pressure cuff on your arm and a pulse/oxygen monitor on a finger. Your vital signs will be monitored every 15 minutes.  Your gown will be pulled up slightly and the groin exposed for a short period of time. The surgeon's assistant will clean your foot, leg, and groin with an antibacterial solution. We will get you covered up as quickly as possible!  Sterile towels and drapes will be used to cover you and the table. You will be asked to keep your hands  under the drapes during the procedure.  The lights will be turned down. The table will be tipped so your feet are higher than your head. You may feel like you're going to slide off, but you won't.  The Procedure  Medication will be injected to numb your leg. You will feel some needle sticks and may feel discomfort as the medication goes in.   Once this is done, you should not experience significant discomfort. But if you do, please let us know and more numbing medication can be injected.  Small incisions are made where the bulging varicose veins have been marked on your leg(s) and these veins will be removed using a small adan hook instrument.    Post-Procedure  Once the procedure is done, your leg(s) will be washed with warm water and dried. Your leg(s) will be bandaged with large soft dressings and a large ACE bandage wrapped from toes to groin.   You will be offered something to drink and a light snack.  You will rest with your leg(s) elevated for approximately 30 minutes. Your friend or family member may join you.  For your safety, you will be taken to your car in a wheelchair. If you are able to, it is good to keep your leg(s) elevated on the car ride home.        Post-Procedure Instructions:                               Phlebectomies      Post-Op Day Zero - The Day of Your Procedure:  1. Medication for Pain Control and Inflammation Control   - The numbing medication injected during your procedure will last for several hours. The pre-procedure                 tablets may make you very sleepy and you might not remember everything from the procedure or from                 the day. This will usually wear off by the next day.   - Ibuprofen: If tolerated, take ibuprofen (e.g., Advil) to reduce inflammation whether or not you have                 pain. For three days, take two tablets (200mg each) with every meal and at bedtime with a snack. If                 your pain is not controlled with ibuprofen, you may  take prescription pain medication (such as Norco),                 if prescribed.   - You may resume taking any medications you were taking before your procedure.  2. Activity   - Rest with your leg(s) elevated above your heart. This will prevent from a lot of swelling and                 bleeding. You do not need to elevate your leg(s) while sleeping at night. You may go upstairs, sit up to                 eat, use the bathroom, and take several five-minute walks. Otherwise, keep your leg(s) elevated.                 Minimize the amount of time you are up on your feet to about 30 minutes at a time.  3. Bandages   - The incision sites will be covered with soft bandages and an ACE wrap. Keep your bandages on               and dry for 48 hours. The ACE should provide  snug  compression but should not cause pain or               numbness in the toes. If you have significant discomfort or your toes become cold or numb, unwrap               your ACE and rewrap with less tension starting at the toes wrapping upward.  4. Incisions   - Bleeding: You may see some incision sites that are oozing through the bandages. This is not unusual    and can be managed with Rest, Ice, Compression and Elevation (RICE). Apply ice and firm pressure    directly to the site that is bleeding and rest with your leg(s) elevated above your heart for 20-30               minutes.    Post-Op Day One:  1. Medication   - Ibuprofen: Continue the same as the Day of Your Procedure. If your pain is not controlled with    ibuprofen, you may take prescription pain medication (such as Norco), if prescribed.  2. Activity   - We would like you to get up at least six times and walk around for short periods of time, unless it is    causing you pain. You should not be on your feet more than 90 minutes at a time. Elevate your leg    above your heart when you are not walking.  3. Bandages   - Your bandages must be kept on and dry for 48 hours.  4. Driving   - You  may resume driving when you can do so safely. Do not drive if you are taking narcotic pain    medication.  Post-Op Day Two:   1. Medication  - Ibuprofen: Continue the same as the Day of Your Procedure.  2.  Activity   - Walk as tolerated. Elevate as much as possible when not walking.  3. Bandages  - You may remove ACE wrap and padding, unless otherwise instructed by your physician. If your return appointment is before 48 hours, we will take the bandages off for you at the clinic. You may shower like normal after your bandages are removed. To aid in the healing, your physician may recommend wearing compression hose for at least one to two weeks following your phlebectomies.  4. Incisions   - Your leg(s) will be bruised; there may be swelling, hard knots under the skin and possibly some    numbness. These will likely resolve over time. If you see  hair-like  strings coming out of your    incisions, do not pull them (this will only cause pain/discomfort). We will trim them when you come   back for your follow-up appointment.  5. Call Us If:   - You see any areas on your leg that are red and angry in appearance.   - You notice any drainage that is milky or cloudy in appearance or that has a foul odor.   - You run a temperature of 100.5 or greater.    Post-Op Day Three:  You will have a follow up appointment 2-4 days post-procedure. At this appointment, we will check your incisions and see how you are doing.   ________________________________________________________________________________________    The Two Weeks Following Your Procedure  1.  Skin Care   - Do not use any lotions, creams or powders on your incisions for 14 days or until the incisions have               healed.   - Do not soak in a bathtub, hot tub or go swimming for 14 days or until your incisions have healed.  2.  Medications   - You may use ibuprofen or acetaminophen (e.g., Tylenol) as needed for pain or discomfort.  3.  Activity   - Do not lift over  25 pounds. After about ten days you may resume exercise such as aerobics, running,    tennis or weightlifting. Use your common sense and ease back into your exercise routine slowly.  4.  Travel   - Do not fly in an airplane for 14 days after your procedure.  If you have a long car trip planned within    two to three weeks following your procedure, stop and walk for a few minutes every two hours.    Periodic ankle pumps during the ride may be helpful.    Six Week Appointment  - At your six-week appointment, you will see your surgeon for an exam and evaluation. This office visit               will be scheduled when you return for post-op day three return appointment.    Return to Work  1.  If you work outside the home, you may return to work in a few days depending on the extent of your        procedure, how you tolerate it, and the type of work you perform.  2.  Paperwork: If your employer requires paperwork or you would like a letter written to your employer, please        let us know. We will complete disability type forms at no charge. Please allow five business days for forms        to be completed.

## 2023-12-20 ENCOUNTER — MYC REFILL (OUTPATIENT)
Dept: FAMILY MEDICINE | Facility: CLINIC | Age: 58
End: 2023-12-20
Payer: COMMERCIAL

## 2023-12-20 ENCOUNTER — MYC MEDICAL ADVICE (OUTPATIENT)
Dept: FAMILY MEDICINE | Facility: CLINIC | Age: 58
End: 2023-12-20
Payer: COMMERCIAL

## 2023-12-20 DIAGNOSIS — F41.1 GAD (GENERALIZED ANXIETY DISORDER): ICD-10-CM

## 2023-12-20 DIAGNOSIS — L85.3 DRY SKIN DERMATITIS: ICD-10-CM

## 2023-12-20 NOTE — TELEPHONE ENCOUNTER
Pt sent My chart message requesting refills. Refills for the medication has already been pended. Thanks    Ryanne Mckeon RN  [facility]

## 2023-12-21 RX ORDER — CLONAZEPAM 0.5 MG/1
0.5 TABLET ORAL 2 TIMES DAILY PRN
Qty: 60 TABLET | Refills: 5 | Status: SHIPPED | OUTPATIENT
Start: 2023-12-21 | End: 2024-07-16

## 2023-12-21 RX ORDER — AMMONIUM LACTATE 12 G/100G
LOTION TOPICAL PRN
Qty: 500 G | Refills: 11 | Status: SHIPPED | OUTPATIENT
Start: 2023-12-21 | End: 2024-06-13

## 2024-01-17 ENCOUNTER — APPOINTMENT (OUTPATIENT)
Dept: LAB | Facility: CLINIC | Age: 59
End: 2024-01-17
Payer: COMMERCIAL

## 2024-01-17 ENCOUNTER — OFFICE VISIT (OUTPATIENT)
Dept: FAMILY MEDICINE | Facility: CLINIC | Age: 59
End: 2024-01-17
Payer: COMMERCIAL

## 2024-01-17 VITALS
OXYGEN SATURATION: 99 % | RESPIRATION RATE: 20 BRPM | HEIGHT: 63 IN | SYSTOLIC BLOOD PRESSURE: 108 MMHG | WEIGHT: 179 LBS | BODY MASS INDEX: 31.71 KG/M2 | HEART RATE: 86 BPM | DIASTOLIC BLOOD PRESSURE: 54 MMHG

## 2024-01-17 DIAGNOSIS — Z11.4 SCREENING FOR HIV (HUMAN IMMUNODEFICIENCY VIRUS): ICD-10-CM

## 2024-01-17 DIAGNOSIS — Z79.4 TYPE 2 DIABETES MELLITUS WITH COMPLICATION, WITH LONG-TERM CURRENT USE OF INSULIN (H): Primary | ICD-10-CM

## 2024-01-17 DIAGNOSIS — Z23 NEED FOR PROPHYLACTIC VACCINATION AGAINST HEPATITIS B VIRUS: ICD-10-CM

## 2024-01-17 DIAGNOSIS — Z23 NEED FOR VACCINATION: ICD-10-CM

## 2024-01-17 DIAGNOSIS — E11.8 TYPE 2 DIABETES MELLITUS WITH COMPLICATION, WITH LONG-TERM CURRENT USE OF INSULIN (H): Primary | ICD-10-CM

## 2024-01-17 DIAGNOSIS — Z12.4 CERVICAL CANCER SCREENING: ICD-10-CM

## 2024-01-17 DIAGNOSIS — Z23 NEED FOR SHINGLES VACCINE: ICD-10-CM

## 2024-01-17 DIAGNOSIS — Z23 NEED FOR TDAP VACCINATION: ICD-10-CM

## 2024-01-17 LAB — HBA1C MFR BLD: 13.4 % (ref 0–5.6)

## 2024-01-17 PROCEDURE — 87389 HIV-1 AG W/HIV-1&-2 AB AG IA: CPT | Performed by: FAMILY MEDICINE

## 2024-01-17 PROCEDURE — 80053 COMPREHEN METABOLIC PANEL: CPT | Performed by: FAMILY MEDICINE

## 2024-01-17 PROCEDURE — 83036 HEMOGLOBIN GLYCOSYLATED A1C: CPT | Performed by: FAMILY MEDICINE

## 2024-01-17 PROCEDURE — 90677 PCV20 VACCINE IM: CPT | Performed by: FAMILY MEDICINE

## 2024-01-17 PROCEDURE — G0009 ADMIN PNEUMOCOCCAL VACCINE: HCPCS | Performed by: FAMILY MEDICINE

## 2024-01-17 PROCEDURE — 36415 COLL VENOUS BLD VENIPUNCTURE: CPT | Performed by: FAMILY MEDICINE

## 2024-01-17 PROCEDURE — 80061 LIPID PANEL: CPT | Performed by: FAMILY MEDICINE

## 2024-01-17 PROCEDURE — 99214 OFFICE O/P EST MOD 30 MIN: CPT | Mod: 25 | Performed by: FAMILY MEDICINE

## 2024-01-17 ASSESSMENT — ASTHMA QUESTIONNAIRES
QUESTION_2 LAST FOUR WEEKS HOW OFTEN HAVE YOU HAD SHORTNESS OF BREATH: MORE THAN ONCE A DAY
ACT_TOTALSCORE: 13
QUESTION_1 LAST FOUR WEEKS HOW MUCH OF THE TIME DID YOUR ASTHMA KEEP YOU FROM GETTING AS MUCH DONE AT WORK, SCHOOL OR AT HOME: SOME OF THE TIME
QUESTION_4 LAST FOUR WEEKS HOW OFTEN HAVE YOU USED YOUR RESCUE INHALER OR NEBULIZER MEDICATION (SUCH AS ALBUTEROL): ONE OR TWO TIMES PER DAY
QUESTION_5 LAST FOUR WEEKS HOW WOULD YOU RATE YOUR ASTHMA CONTROL: SOMEWHAT CONTROLLED
ACT_TOTALSCORE: 13
QUESTION_3 LAST FOUR WEEKS HOW OFTEN DID YOUR ASTHMA SYMPTOMS (WHEEZING, COUGHING, SHORTNESS OF BREATH, CHEST TIGHTNESS OR PAIN) WAKE YOU UP AT NIGHT OR EARLIER THAN USUAL IN THE MORNING: ONCE OR TWICE

## 2024-01-17 ASSESSMENT — PATIENT HEALTH QUESTIONNAIRE - PHQ9
SUM OF ALL RESPONSES TO PHQ QUESTIONS 1-9: 19
SUM OF ALL RESPONSES TO PHQ QUESTIONS 1-9: 19
10. IF YOU CHECKED OFF ANY PROBLEMS, HOW DIFFICULT HAVE THESE PROBLEMS MADE IT FOR YOU TO DO YOUR WORK, TAKE CARE OF THINGS AT HOME, OR GET ALONG WITH OTHER PEOPLE: VERY DIFFICULT

## 2024-01-17 NOTE — PATIENT INSTRUCTIONS
[facility] nutrition plan 30g chocolate shake   [employer], [place], [facility]

## 2024-01-17 NOTE — PROGRESS NOTES
"  Assessment & Plan     Type 2 diabetes mellitus with complication, with long-term current use of insulin (H)  Will check labs today.  Managed by endo   - HEMOGLOBIN A1C; Future  - Comprehensive metabolic panel (BMP + Alb, Alk Phos, ALT, AST, Total. Bili, TP); Future  - Lipid panel reflex to direct LDL Fasting; Future  - HEMOGLOBIN A1C  - Comprehensive metabolic panel (BMP + Alb, Alk Phos, ALT, AST, Total. Bili, TP)  - Lipid panel reflex to direct LDL Fasting    Screening for HIV (human immunodeficiency virus)  - HIV Antigen Antibody Combo; Future  - HIV Antigen Antibody Combo    Need for vaccination  - Pneumococcal 20 Valent Conjugate (Prevnar 20)    Ky French is a 58 year old, presenting for the following health issues:    Diabetes (Follow up) and Forms (Papers need signed)      1/17/2024    10:01 AM   Additional Questions   Roomed by Makenzie   Accompanied by -Wilbert     History of Present Illness       Reason for visit:  Paperwork    She eats 2-3 servings of fruits and vegetables daily.She consumes 0 sweetened beverage(s) daily.She exercises with enough effort to increase her heart rate 10 to 19 minutes per day.  She exercises with enough effort to increase her heart rate 3 or less days per week. She is missing 4 dose(s) of medications per week.     A lot of dental issues so had all of her teeth removed   Working on getting dentures   First fitting done yesterday   Hopefully in march will have teeth   Eating a lot of soup now   Very soft foods   Discussed protein      Needs paperwork for  her  nebulizer for her electricity bill      Clonazepam working well     Seeing a dietician and CDE and endo at [facility]   On basaglar and novolog and victoza    Eye doctor 4 months ago         Objective    /54 (BP Location: Right arm, Patient Position: Sitting, Cuff Size: Adult Regular)   Pulse 86   Resp 20   Ht 1.6 m (5' 3\")   Wt 81.2 kg (179 lb)   LMP 10/01/2013 (Approximate)   SpO2 99%   BMI " 31.71 kg/m    Body mass index is 31.71 kg/m .    Physical Exam  Constitutional:       General: She is not in acute distress.  Eyes:      General: No scleral icterus.  Pulmonary:      Effort: No respiratory distress.   Neurological:      Mental Status: She is alert.   Psychiatric:         Mood and Affect: Mood normal.         Behavior: Behavior normal.               Signed Electronically by: Jose Sanchez DO

## 2024-01-18 LAB
ALBUMIN SERPL BCG-MCNC: 4.4 G/DL (ref 3.5–5.2)
ALP SERPL-CCNC: 144 U/L (ref 40–150)
ALT SERPL W P-5'-P-CCNC: 28 U/L (ref 0–50)
ANION GAP SERPL CALCULATED.3IONS-SCNC: 12 MMOL/L (ref 7–15)
AST SERPL W P-5'-P-CCNC: 33 U/L (ref 0–45)
BILIRUB SERPL-MCNC: 0.4 MG/DL
BUN SERPL-MCNC: 11.5 MG/DL (ref 6–20)
CALCIUM SERPL-MCNC: 9.7 MG/DL (ref 8.6–10)
CHLORIDE SERPL-SCNC: 94 MMOL/L (ref 98–107)
CHOLEST SERPL-MCNC: 108 MG/DL
CREAT SERPL-MCNC: 0.52 MG/DL (ref 0.51–0.95)
DEPRECATED HCO3 PLAS-SCNC: 29 MMOL/L (ref 22–29)
EGFRCR SERPLBLD CKD-EPI 2021: >90 ML/MIN/1.73M2
FASTING STATUS PATIENT QL REPORTED: NO
GLUCOSE SERPL-MCNC: 500 MG/DL (ref 70–99)
HDLC SERPL-MCNC: 38 MG/DL
HIV 1+2 AB+HIV1 P24 AG SERPL QL IA: NONREACTIVE
LDLC SERPL CALC-MCNC: 46 MG/DL
NONHDLC SERPL-MCNC: 70 MG/DL
POTASSIUM SERPL-SCNC: 4.5 MMOL/L (ref 3.4–5.3)
PROT SERPL-MCNC: 7.6 G/DL (ref 6.4–8.3)
SODIUM SERPL-SCNC: 135 MMOL/L (ref 135–145)
TRIGL SERPL-MCNC: 119 MG/DL

## 2024-02-06 ENCOUNTER — MEDICAL CORRESPONDENCE (OUTPATIENT)
Dept: HEALTH INFORMATION MANAGEMENT | Facility: CLINIC | Age: 59
End: 2024-02-06
Payer: COMMERCIAL

## 2024-02-07 DIAGNOSIS — B35.4 TINEA CORPORIS: Primary | ICD-10-CM

## 2024-02-08 RX ORDER — CLOTRIMAZOLE 1 G/ML
SOLUTION TOPICAL 2 TIMES DAILY
Qty: 60 ML | Refills: 11 | Status: SHIPPED | OUTPATIENT
Start: 2024-02-08 | End: 2024-03-05

## 2024-02-08 NOTE — TELEPHONE ENCOUNTER
Please see message below. Medication is not currently on active medication list. Thanks    Ryanne Mckeon RN  [facility]

## 2024-02-25 ENCOUNTER — HEALTH MAINTENANCE LETTER (OUTPATIENT)
Age: 59
End: 2024-02-25

## 2024-03-05 DIAGNOSIS — B35.4 TINEA CORPORIS: ICD-10-CM

## 2024-03-07 RX ORDER — CLOTRIMAZOLE 1 G/ML
SOLUTION TOPICAL 2 TIMES DAILY
Qty: 60 ML | Refills: 11 | Status: SHIPPED | OUTPATIENT
Start: 2024-03-07

## 2024-03-12 ENCOUNTER — TELEPHONE (OUTPATIENT)
Dept: FAMILY MEDICINE | Facility: CLINIC | Age: 59
End: 2024-03-12
Payer: COMMERCIAL

## 2024-03-12 NOTE — TELEPHONE ENCOUNTER
Pharmacy calling to see why clotrimazole 1% was cancelled   Just renewal and pharmacy will fill    Ciarra LION RN  [facility]

## 2024-05-05 ENCOUNTER — HEALTH MAINTENANCE LETTER (OUTPATIENT)
Age: 59
End: 2024-05-05

## 2024-05-14 DIAGNOSIS — Z79.4 TYPE 2 DIABETES MELLITUS WITH COMPLICATION, WITH LONG-TERM CURRENT USE OF INSULIN (H): ICD-10-CM

## 2024-05-14 DIAGNOSIS — G25.81 RESTLESS LEG SYNDROME: ICD-10-CM

## 2024-05-14 DIAGNOSIS — J45.30 MILD PERSISTENT ASTHMA WITHOUT COMPLICATION: ICD-10-CM

## 2024-05-14 DIAGNOSIS — E11.8 TYPE 2 DIABETES MELLITUS WITH COMPLICATION, WITH LONG-TERM CURRENT USE OF INSULIN (H): ICD-10-CM

## 2024-05-14 DIAGNOSIS — F33.2 SEVERE EPISODE OF RECURRENT MAJOR DEPRESSIVE DISORDER, WITHOUT PSYCHOTIC FEATURES (H): ICD-10-CM

## 2024-05-14 DIAGNOSIS — I10 ESSENTIAL HYPERTENSION: ICD-10-CM

## 2024-05-14 DIAGNOSIS — K21.9 GASTROESOPHAGEAL REFLUX DISEASE, UNSPECIFIED WHETHER ESOPHAGITIS PRESENT: ICD-10-CM

## 2024-05-14 RX ORDER — SERTRALINE HYDROCHLORIDE 100 MG/1
200 TABLET, FILM COATED ORAL DAILY
Qty: 180 TABLET | Refills: 3 | Status: SHIPPED | OUTPATIENT
Start: 2024-05-14

## 2024-05-14 RX ORDER — LOSARTAN POTASSIUM 50 MG/1
50 TABLET ORAL DAILY
Qty: 90 TABLET | Refills: 3 | Status: SHIPPED | OUTPATIENT
Start: 2024-05-14

## 2024-05-14 RX ORDER — ALBUTEROL SULFATE 90 UG/1
1-2 AEROSOL, METERED RESPIRATORY (INHALATION) EVERY 4 HOURS PRN
Qty: 18 G | Refills: 11 | Status: SHIPPED | OUTPATIENT
Start: 2024-05-14

## 2024-05-14 RX ORDER — FAMOTIDINE 20 MG/1
20 TABLET, FILM COATED ORAL 2 TIMES DAILY
Qty: 180 TABLET | Refills: 3 | Status: SHIPPED | OUTPATIENT
Start: 2024-05-14

## 2024-05-14 RX ORDER — ATORVASTATIN CALCIUM 80 MG/1
80 TABLET, FILM COATED ORAL DAILY
Qty: 90 TABLET | Refills: 3 | Status: SHIPPED | OUTPATIENT
Start: 2024-05-14

## 2024-05-14 RX ORDER — PRAMIPEXOLE DIHYDROCHLORIDE 0.25 MG/1
0.5 TABLET ORAL AT BEDTIME
Qty: 180 TABLET | Refills: 3 | Status: SHIPPED | OUTPATIENT
Start: 2024-05-14

## 2024-05-14 RX ORDER — EZETIMIBE 10 MG/1
10 TABLET ORAL DAILY
Qty: 90 TABLET | Refills: 3 | Status: SHIPPED | OUTPATIENT
Start: 2024-05-14

## 2024-05-14 RX ORDER — METFORMIN HCL 500 MG
500 TABLET, EXTENDED RELEASE 24 HR ORAL 2 TIMES DAILY WITH MEALS
Qty: 180 TABLET | Refills: 3 | Status: SHIPPED | OUTPATIENT
Start: 2024-05-14

## 2024-05-20 ENCOUNTER — TELEPHONE (OUTPATIENT)
Dept: FAMILY MEDICINE | Facility: CLINIC | Age: 59
End: 2024-05-20
Payer: COMMERCIAL

## 2024-05-20 NOTE — TELEPHONE ENCOUNTER
Pharmacy calling refill of metformin Xr 500mg 24 hr  Informed rx sent to pharmacy 5/14/24 for 90 days and 3 refills     Pharmacy noted and will fill.     Ciarra LION RN  [facility]

## 2024-06-03 DIAGNOSIS — J45.30 MILD PERSISTENT ASTHMA WITHOUT COMPLICATION: ICD-10-CM

## 2024-06-04 ENCOUNTER — TELEPHONE (OUTPATIENT)
Dept: FAMILY MEDICINE | Facility: CLINIC | Age: 59
End: 2024-06-04
Payer: COMMERCIAL

## 2024-06-04 RX ORDER — FLUTICASONE FUROATE 200 UG/1
1 POWDER RESPIRATORY (INHALATION) AT BEDTIME
Qty: 30 EACH | Refills: 11 | Status: SHIPPED | OUTPATIENT
Start: 2024-06-04

## 2024-06-04 NOTE — TELEPHONE ENCOUNTER
Pt calling stating that [employer] will be faxing over information for pt to get a scooter. Wanted to call to let us know.     [employer] phone number is [phone].    Ryanne Mckeon RN  [facility]

## 2024-06-07 ENCOUNTER — TELEPHONE (OUTPATIENT)
Dept: FAMILY MEDICINE | Facility: CLINIC | Age: 59
End: 2024-06-07
Payer: COMMERCIAL

## 2024-06-07 DIAGNOSIS — E11.42 DIABETIC POLYNEUROPATHY ASSOCIATED WITH TYPE 2 DIABETES MELLITUS (H): Primary | ICD-10-CM

## 2024-06-07 DIAGNOSIS — R26.81 UNSTABLE GAIT: ICD-10-CM

## 2024-06-07 NOTE — TELEPHONE ENCOUNTER
Pt has not had a PT evaluation in some time.   Orders pended. Pt sent [employer] message with referral info.   Thanks,   Candelario Sanchez RN  [facility]

## 2024-06-12 DIAGNOSIS — K21.9 GASTROESOPHAGEAL REFLUX DISEASE, UNSPECIFIED WHETHER ESOPHAGITIS PRESENT: ICD-10-CM

## 2024-06-12 DIAGNOSIS — I10 ESSENTIAL HYPERTENSION: ICD-10-CM

## 2024-06-13 ENCOUNTER — OFFICE VISIT (OUTPATIENT)
Dept: FAMILY MEDICINE | Facility: CLINIC | Age: 59
End: 2024-06-13
Payer: COMMERCIAL

## 2024-06-13 ENCOUNTER — PATIENT OUTREACH (OUTPATIENT)
Dept: CARE COORDINATION | Facility: CLINIC | Age: 59
End: 2024-06-13

## 2024-06-13 ENCOUNTER — ORDERS ONLY (AUTO-RELEASED) (OUTPATIENT)
Dept: FAMILY MEDICINE | Facility: CLINIC | Age: 59
End: 2024-06-13

## 2024-06-13 ENCOUNTER — LAB (OUTPATIENT)
Dept: LAB | Facility: CLINIC | Age: 59
End: 2024-06-13
Payer: COMMERCIAL

## 2024-06-13 VITALS
DIASTOLIC BLOOD PRESSURE: 71 MMHG | BODY MASS INDEX: 30.65 KG/M2 | TEMPERATURE: 96.7 F | WEIGHT: 173 LBS | HEART RATE: 78 BPM | HEIGHT: 63 IN | RESPIRATION RATE: 16 BRPM | SYSTOLIC BLOOD PRESSURE: 127 MMHG | OXYGEN SATURATION: 98 %

## 2024-06-13 DIAGNOSIS — R35.0 URINARY FREQUENCY: ICD-10-CM

## 2024-06-13 DIAGNOSIS — Z79.4 TYPE 2 DIABETES MELLITUS WITH COMPLICATION, WITH LONG-TERM CURRENT USE OF INSULIN (H): ICD-10-CM

## 2024-06-13 DIAGNOSIS — G63 POLYNEUROPATHY ASSOCIATED WITH UNDERLYING DISEASE (H): ICD-10-CM

## 2024-06-13 DIAGNOSIS — R30.0 DYSURIA: ICD-10-CM

## 2024-06-13 DIAGNOSIS — L85.3 DRY SKIN DERMATITIS: ICD-10-CM

## 2024-06-13 DIAGNOSIS — Z12.11 SCREEN FOR COLON CANCER: ICD-10-CM

## 2024-06-13 DIAGNOSIS — R39.15 URINARY URGENCY: ICD-10-CM

## 2024-06-13 DIAGNOSIS — R01.1 UNDIAGNOSED CARDIAC MURMURS: ICD-10-CM

## 2024-06-13 DIAGNOSIS — R32 URINARY INCONTINENCE, UNSPECIFIED TYPE: ICD-10-CM

## 2024-06-13 DIAGNOSIS — L84 PRE-ULCERATIVE CALLUSES: ICD-10-CM

## 2024-06-13 DIAGNOSIS — B37.31 YEAST INFECTION OF THE VAGINA: ICD-10-CM

## 2024-06-13 DIAGNOSIS — L89.151 PRESSURE INJURY OF SACRAL REGION, STAGE 1: ICD-10-CM

## 2024-06-13 DIAGNOSIS — Z00.00 ENCOUNTER FOR MEDICARE ANNUAL WELLNESS EXAM: Primary | ICD-10-CM

## 2024-06-13 DIAGNOSIS — R68.81 EARLY SATIETY: ICD-10-CM

## 2024-06-13 DIAGNOSIS — Z12.4 CERVICAL CANCER SCREENING: ICD-10-CM

## 2024-06-13 DIAGNOSIS — E11.8 TYPE 2 DIABETES MELLITUS WITH COMPLICATION, WITH LONG-TERM CURRENT USE OF INSULIN (H): ICD-10-CM

## 2024-06-13 LAB
ALBUMIN UR-MCNC: NEGATIVE MG/DL
APPEARANCE UR: CLEAR
BACTERIA #/AREA URNS HPF: ABNORMAL /HPF
BILIRUB UR QL STRIP: NEGATIVE
CLUE CELLS: ABNORMAL
COLOR UR AUTO: YELLOW
GLUCOSE UR STRIP-MCNC: >=1000 MG/DL
HBA1C MFR BLD: >15 % (ref 0–5.6)
HGB UR QL STRIP: ABNORMAL
KETONES UR STRIP-MCNC: NEGATIVE MG/DL
LEUKOCYTE ESTERASE UR QL STRIP: ABNORMAL
NITRATE UR QL: NEGATIVE
PH UR STRIP: 7.5 [PH] (ref 5–7)
RBC #/AREA URNS AUTO: ABNORMAL /HPF
SP GR UR STRIP: 1.01 (ref 1–1.03)
SQUAMOUS #/AREA URNS AUTO: ABNORMAL /LPF
TRICHOMONAS, WET PREP: ABNORMAL
UROBILINOGEN UR STRIP-ACNC: 0.2 E.U./DL
WBC #/AREA URNS AUTO: ABNORMAL /HPF
WBC'S/HIGH POWER FIELD, WET PREP: ABNORMAL
YEAST, WET PREP: ABNORMAL

## 2024-06-13 PROCEDURE — 36415 COLL VENOUS BLD VENIPUNCTURE: CPT

## 2024-06-13 PROCEDURE — G0145 SCR C/V CYTO,THINLAYER,RESCR: HCPCS | Performed by: FAMILY MEDICINE

## 2024-06-13 PROCEDURE — 87186 SC STD MICRODIL/AGAR DIL: CPT | Performed by: FAMILY MEDICINE

## 2024-06-13 PROCEDURE — 87624 HPV HI-RISK TYP POOLED RSLT: CPT | Performed by: FAMILY MEDICINE

## 2024-06-13 PROCEDURE — 87088 URINE BACTERIA CULTURE: CPT | Performed by: FAMILY MEDICINE

## 2024-06-13 PROCEDURE — 83036 HEMOGLOBIN GLYCOSYLATED A1C: CPT

## 2024-06-13 PROCEDURE — G0439 PPPS, SUBSEQ VISIT: HCPCS | Performed by: FAMILY MEDICINE

## 2024-06-13 PROCEDURE — 87210 SMEAR WET MOUNT SALINE/INK: CPT | Performed by: FAMILY MEDICINE

## 2024-06-13 PROCEDURE — 81001 URINALYSIS AUTO W/SCOPE: CPT | Performed by: FAMILY MEDICINE

## 2024-06-13 PROCEDURE — 80053 COMPREHEN METABOLIC PANEL: CPT

## 2024-06-13 PROCEDURE — 85025 COMPLETE CBC W/AUTO DIFF WBC: CPT

## 2024-06-13 PROCEDURE — 87086 URINE CULTURE/COLONY COUNT: CPT | Performed by: FAMILY MEDICINE

## 2024-06-13 PROCEDURE — 99214 OFFICE O/P EST MOD 30 MIN: CPT | Mod: 25 | Performed by: FAMILY MEDICINE

## 2024-06-13 RX ORDER — FLUCONAZOLE 150 MG/1
150 TABLET ORAL
Qty: 3 TABLET | Refills: 0 | Status: SHIPPED | OUTPATIENT
Start: 2024-06-13 | End: 2024-06-20

## 2024-06-13 RX ORDER — HYDROCHLOROTHIAZIDE 12.5 MG/1
12.5 CAPSULE ORAL EVERY MORNING
Qty: 90 CAPSULE | Refills: 1 | Status: SHIPPED | OUTPATIENT
Start: 2024-06-13

## 2024-06-13 RX ORDER — AMMONIUM LACTATE 12 G/100G
LOTION TOPICAL PRN
Qty: 500 G | Refills: 11 | Status: SHIPPED | OUTPATIENT
Start: 2024-06-13

## 2024-06-13 RX ORDER — ZINC OXIDE 20 %
OINTMENT (GRAM) TOPICAL
Qty: 500 G | Refills: 5 | Status: SHIPPED | OUTPATIENT
Start: 2024-06-13

## 2024-06-13 SDOH — HEALTH STABILITY: PHYSICAL HEALTH: ON AVERAGE, HOW MANY DAYS PER WEEK DO YOU ENGAGE IN MODERATE TO STRENUOUS EXERCISE (LIKE A BRISK WALK)?: 0 DAYS

## 2024-06-13 ASSESSMENT — ASTHMA QUESTIONNAIRES
QUESTION_2 LAST FOUR WEEKS HOW OFTEN HAVE YOU HAD SHORTNESS OF BREATH: ONCE A DAY
QUESTION_1 LAST FOUR WEEKS HOW MUCH OF THE TIME DID YOUR ASTHMA KEEP YOU FROM GETTING AS MUCH DONE AT WORK, SCHOOL OR AT HOME: SOME OF THE TIME
QUESTION_5 LAST FOUR WEEKS HOW WOULD YOU RATE YOUR ASTHMA CONTROL: SOMEWHAT CONTROLLED
QUESTION_3 LAST FOUR WEEKS HOW OFTEN DID YOUR ASTHMA SYMPTOMS (WHEEZING, COUGHING, SHORTNESS OF BREATH, CHEST TIGHTNESS OR PAIN) WAKE YOU UP AT NIGHT OR EARLIER THAN USUAL IN THE MORNING: ONCE A WEEK
ACT_TOTALSCORE: 14
ACT_TOTALSCORE: 14
QUESTION_4 LAST FOUR WEEKS HOW OFTEN HAVE YOU USED YOUR RESCUE INHALER OR NEBULIZER MEDICATION (SUCH AS ALBUTEROL): TWO OR THREE TIMES PER WEEK

## 2024-06-13 ASSESSMENT — PATIENT HEALTH QUESTIONNAIRE - PHQ9
10. IF YOU CHECKED OFF ANY PROBLEMS, HOW DIFFICULT HAVE THESE PROBLEMS MADE IT FOR YOU TO DO YOUR WORK, TAKE CARE OF THINGS AT HOME, OR GET ALONG WITH OTHER PEOPLE: SOMEWHAT DIFFICULT
SUM OF ALL RESPONSES TO PHQ QUESTIONS 1-9: 19
SUM OF ALL RESPONSES TO PHQ QUESTIONS 1-9: 19

## 2024-06-13 ASSESSMENT — SOCIAL DETERMINANTS OF HEALTH (SDOH): HOW OFTEN DO YOU GET TOGETHER WITH FRIENDS OR RELATIVES?: NEVER

## 2024-06-13 ASSESSMENT — PAIN SCALES - GENERAL: PAINLEVEL: EXTREME PAIN (8)

## 2024-06-13 NOTE — PROGRESS NOTES
Preventive Care Visit  [facility]  Jose Sanchez DO, Family Medicine  Jun 13, 2024      Assessment & Plan     (R32) Urinary incontinence, unspecified type  (primary encounter diagnosis)  Comment: Urgency increasing, has to wear briefs all the time now. Erythema of vagina and rectal area.   Plan: Adult Urology  Referral, Physical         Therapy  Referral, zinc oxide         (DESITIN) 20 % external ointment        Bladder training referral. Treat fungal infection, apply barrier cream.     (Z12.4) Cervical cancer screening  Comment: Unsure of last one. No abnormal ones.   Plan: Pap Screen with HPV - Recommended Age 30 - 65         Years        Cervical os located at 11 o'clock. No erythema of vaginal canal or discharge. However external genital is extremely inflamed. She is experiencing pruritus too. Wep Prep.     (Z12.11) Screen for colon cancer  Comment:   Plan: COLOGUARD([employer])            (E11.8,  Z79.4) Type 2 diabetes mellitus with complication, with long-term current use of insulin (H)  Comment: A1C last 13.4. On insulin ++ GLP1 + metformin. Left foot has two small erythematous macules/patch with some callus starting to build. She is in need of better shoes. Discussed importance of daily foot inspections.    Plan: Hemoglobin A1c, Comprehensive metabolic panel         (BMP + Alb, Alk Phos, ALT, AST, Total. Bili,         TP), Orthotics, Mastectomy and Custom         Compression Orders, Orthopedic          Referral, CANCELED: HEMOGLOBIN A1C        Recheck labs, orthotic order.     (L85.3) Dry skin dermatitis  Comment:   Plan: ammonium lactate (LAC-HYDRIN) 12 % external         lotion            (R30.0) Dysuria  Comment: Complaints of difficulty with urination   Plan: UA Macroscopic with reflex to Microscopic and         Culture - Lab Collect, Wet prep - Clinic         Collect, UA Microscopic with Reflex to Culture,        Urine Culture        " Lab pending, monitor     (R35.0) Urinary frequency  Comment:   Plan: Adult Urology  Referral, Physical         Therapy  Referral            (R39.15) Urinary urgency  Comment:   Plan: Adult Urology  Referral, Physical         Therapy  Referral            (R68.81) Early satiety  Comment: Reports difficulty eating due to feeling full. Normal bowel movements.   Plan: NM Gastric Emptying            (B37.31) Yeast infection of the vagina  Comment: Erythematous external genitalia   Plan: fluconazole (DIFLUCAN) 150 MG tablet, zinc         oxide (DESITIN) 20 % external ointment        Treat fungal infection, provide barrier cream    (L89.151) Pressure injury of sacral region, stage 1  Comment: Erythema of sacral area. Dicussed need for frequent position changes which she is capable of.   Plan: Wound Care Order for DME - ONLY FOR DME        Mepilex to area.     (G63) Polyneuropathy associated with underlying disease (H24)  Comment: Abnormal monofilament   Plan: Orthotics, Mastectomy and Custom Compression         Orders, Orthopedic  Referral        Continue to monitor. New orthotics.     (L84) Pre-ulcerative calluses  Comment: Anterior of left foot distal end of 2nd and 3rd metatarsal erythema with start of callus. She has never seen a podiatrist. Cuts her own toenails. Hypertrophied toenails.   Plan: Orthopedic  Referral        Refer for podiatry.     (R01.1) Undiagnosed cardiac murmurs  Comment: Abnormal murmur ausculted well at second intercostal space of right and left sternal border. Difficult to distinguish location in cardiac cycle. Asymptomatic.   Plan: Echocardiogram Complete, CBC with platelets and        differential        Echo referral.    Patient has been advised of split billing requirements and indicates understanding: Yes        BMI  Estimated body mass index is 30.65 kg/m  as calculated from the following:    Height as of this encounter: 1.6 m (5' 3\").   "  Weight as of this encounter: 78.5 kg (173 lb).   Weight management plan: Discussed healthy diet and exercise guidelines    Counseling  Appropriate preventive services were discussed with this patient, including applicable screening as appropriate for fall prevention, nutrition, physical activity, Tobacco-use cessation, weight loss and cognition.  Checklist reviewing preventive services available has been given to the patient.  Reviewed patient's diet, addressing concerns and/or questions.   Patient is at risk for social isolation and has been provided with information about the benefit of social connection.   Patient reported safety concerns were addressed today.The patient was provided with written information regarding signs of hearing loss.   Information on urinary incontinence and treatment options given to patient.   The patient's PHQ-9 score is consistent with moderate depression. She was provided with information regarding depression.       MEDICATIONS:        - Start taking fluconazole and zinc oxide        - Continue other medications without change  CONSULTATION/REFERRAL to orthopedic, orthotics, PT, urology   SELF MONITORING:       - Please check blood glucose readings four times daily before meals and at bedtime       - Monitor feet daily for skin breakdown  Work on weight loss  Regular exercise    Ky French is a 58 year old, presenting for the following:  Wellness Visit        6/13/2024     2:33 PM   Additional Questions   Roomed by Kaylene WELLINGTON   Accompanied by /PCA - Wero         Via the Health Maintenance questionnaire, the patient has reported the following services have been completed -[facility], this information has been sent to the abstraction team.  Health Care Directive  Patient does not have a Health Care Directive or Living Will: Discussed advance care planning with patient; information given to patient to review.    HPI    Urinary frequency a couple times  with urgency   Has to wear diapers throughout the day     Bm 4 times a day   Just got some metamucil yesterday and will use it every day     Discussed advanced directive     Needs scooter and wheelchair         6/13/2024   General Health   How would you rate your overall physical health? (!) POOR   Feel stress (tense, anxious, or unable to sleep) Very much   (!) STRESS CONCERN      6/13/2024   Nutrition   Diet: Diabetic    Carbohydrate counting    Other   If other, please elaborate: sometimes no appetite         6/13/2024   Exercise   Days per week of moderate/strenous exercise 0 days   (!) EXERCISE CONCERN      6/13/2024   Social Factors   Frequency of gathering with friends or relatives Never   Worry food won't last until get money to buy more No   Food not last or not have enough money for food? No   Do you have housing?  Yes   Are you worried about losing your housing? No   Lack of transportation? No   Unable to get utilities (heat,electricity)? No   (!) SOCIAL CONNECTIONS CONCERN      6/13/2024   Fall Risk   Fallen 2 or more times in the past year? Yes   Trouble with walking or balance? Yes   Gait Speed Test (Document in seconds) 7.63   Gait Speed Test Interpretation Greater than 5.01 seconds - ABNORMAL          6/13/2024   Activities of Daily Living- Home Safety   Needs help with the following daily activites None of the above   Safety concerns in the home Poor lighting         6/13/2024   Dental   Dentist two times every year? Yes         6/13/2024   Hearing Screening   Hearing concerns? (!) I FEEL THAT PEOPLE ARE MUMBLING OR NOT SPEAKING CLEARLY.    (!) I NEED TO ASK PEOPLE TO SPEAK UP OR REPEAT THEMSELVES.    (!) IT'S HARD TO FOLLOW A CONVERSATION IN A NOISY RESTAURANT OR CROWDED ROOM.    (!) TROUBLE UNDESTANDING A SPEAKER IN A PUBLIC MEETING OR [employer] SERVICE.    (!) TROUBLE UNDERSTANDING SOFT OR WHISPERED SPEECH.    (!) TROUBLE UNDERSTANDING SPEECH ON THE TELEPHONE         6/13/2024   Driving Risk  Screening   Patient/family members have concerns about driving (!) DECLINE         6/13/2024   General Alertness/Fatigue Screening   Have you been more tired than usual lately? No         6/13/2024   Urinary Incontinence Screening   Bothered by leaking urine in past 6 months Yes         6/13/2024   TB Screening   Were you born outside of the [place]? No       Today's PHQ-9 Score:       6/13/2024     2:12 PM   PHQ-9 SCORE   PHQ-9 Total Score MyChart 19 (Moderately severe depression)   PHQ-9 Total Score 19         6/13/2024   Substance Use   Alcohol more than 3/day or more than 7/wk No   Do you have a current opioid prescription? No   How severe/bad is pain from 1 to 10? 0/10 (No Pain)   Do you use any other substances recreationally? No     Social History     Tobacco Use    Smoking status: Never     Passive exposure: Never    Smokeless tobacco: Never   Vaping Use    Vaping status: Never Used          Mammogram Screening - Mammogram every 1-2 years updated in Health Maintenance based on mutual decision making      History of abnormal Pap smear: No - age 30-64 HPV with reflex Pap every 5 years recommended       ASCVD Risk   The ASCVD Risk score (Kaden SHEN, et al., 2019) failed to calculate for the following reasons:    The valid total cholesterol range is 130 to 320 mg/dL            Reviewed and updated as needed this visit by Provider                Diabetic foot exam: no sensation across entire bottom of foot, ulceration at left top of foot distal end of 2nd and 3rd metatarsal, and nail exam onychomycosis of the toenails and dystrophic nails     Review Of Systems  Skin: itching  Eyes: negative  Ears/Nose/Throat: negative  Respiratory: No shortness of breath, dyspnea on exertion, cough, or hemoptysis  Cardiovascular: negative  Gastrointestinal: poor appetite  Genitourinary: dysuria, frequency, urgency, and incontinence  Musculoskeletal: negative  Neurologic: numbness or tingling of feet  Psychiatric:  "negative  Hematologic/Lymphatic/Immunologic: negative  Endocrine: diabetes   Current providers sharing in care for this patient include:  Patient Care Team:  Jose Sanchez DO as PCP - General (Family Medicine)  Jose Sanchez DO as Assigned PCP  Niraj Spicer MD as Assigned Surgical Provider  Jose Mcconnell MD as Assigned Heart and Vascular Provider  Cora Garcia LICSW as Assigned Behavioral Health Provider    The following health maintenance items are reviewed in Epic and correct as of today:  Health Maintenance   Topic Date Due    ASTHMA ACTION PLAN  Never done    ADVANCE CARE PLANNING  Never done    DEPRESSION ACTION PLAN  Never done    MEDICARE ANNUAL WELLNESS VISIT  Never done    HEPATITIS B IMMUNIZATION (1 of 3 - 19+ 3-dose series) Never done    PAP  Never done    DTAP/TDAP/TD IMMUNIZATION (1 - Tdap) Never done    ZOSTER IMMUNIZATION (1 of 2) Never done    COVID-19 Vaccine (3 - 2023-24 season) 09/01/2023    MAMMO SCREENING  12/16/2023    COLORECTAL CANCER SCREENING  03/28/2024    A1C  04/17/2024    EYE EXAM  06/27/2024    MICROALBUMIN  08/02/2024    DIABETIC FOOT EXAM  08/02/2024    ANNUAL REVIEW OF HM ORDERS  08/02/2024    ASTHMA CONTROL TEST  12/13/2024    PHQ-9  12/13/2024    BMP  01/17/2025    LIPID  01/17/2025    HEPATITIS C SCREENING  Completed    HIV SCREENING  Completed    INFLUENZA VACCINE  Completed    Pneumococcal Vaccine: Pediatrics (0 to 5 Years) and At-Risk Patients (6 to 64 Years)  Completed    IPV IMMUNIZATION  Aged Out    HPV IMMUNIZATION  Aged Out    MENINGITIS IMMUNIZATION  Aged Out    RSV MONOCLONAL ANTIBODY  Aged Out        Objective    Exam  /71 (BP Location: Left arm, Patient Position: Sitting, Cuff Size: Adult Regular)   Pulse 78   Temp (!) 96.7  F (35.9  C) (Temporal)   Resp 16   Ht 1.6 m (5' 3\")   Wt 78.5 kg (173 lb)   LMP 10/01/2013 (Approximate)   SpO2 98%   BMI 30.65 kg/m     Estimated body mass index is 30.65 kg/m  as calculated from the " "following:    Height as of this encounter: 1.6 m (5' 3\").    Weight as of this encounter: 78.5 kg (173 lb).    Physical Exam  Exam conducted with a chaperone present.   Constitutional:       Appearance: She is obese.   HENT:      Head: Normocephalic and atraumatic.      Right Ear: External ear normal.      Left Ear: External ear normal.      Nose: Nose normal.      Mouth/Throat:      Mouth: Mucous membranes are moist.      Pharynx: Oropharynx is clear.   Eyes:      Extraocular Movements: Extraocular movements intact.      Conjunctiva/sclera: Conjunctivae normal.      Pupils: Pupils are equal, round, and reactive to light.   Cardiovascular:      Rate and Rhythm: Normal rate and regular rhythm.      Heart sounds: Murmur heard.   Pulmonary:      Effort: Pulmonary effort is normal.      Breath sounds: Normal breath sounds.   Abdominal:      Palpations: Abdomen is soft.   Genitourinary:     Pubic Area: Rash present.      Labia:         Right: Rash present.         Left: Rash present.       Cervix: Normal.      Comments: cervix shows no lesions, inflammation, discharge or tenderness. No cystocele  Musculoskeletal:         General: Normal range of motion.      Cervical back: Normal range of motion.   Skin:     Findings: Erythema present.      Comments: Sacral erythema [id number]  Anterior of left foot distal end of 2nd and 3rd metatarsal erythema with start of callus   Neurological:      Mental Status: She is alert and oriented to person, place, and time.   Psychiatric:         Mood and Affect: Mood normal.         Behavior: Behavior normal.         Thought Content: Thought content normal.         Judgment: Judgment normal.             6/13/2024   Mini Cog   Clock Draw Score 2 Normal   3 Item Recall 1 object recalled   Mini Cog Total Score 3            DARRELL Simpson student  Signed Electronically by: Jose Sanchez DO    "

## 2024-06-14 ENCOUNTER — TELEPHONE (OUTPATIENT)
Dept: FAMILY MEDICINE | Facility: CLINIC | Age: 59
End: 2024-06-14
Payer: COMMERCIAL

## 2024-06-14 LAB
ALBUMIN SERPL BCG-MCNC: 4.2 G/DL (ref 3.5–5.2)
ALP SERPL-CCNC: 159 U/L (ref 40–150)
ALT SERPL W P-5'-P-CCNC: 43 U/L (ref 0–50)
ANION GAP SERPL CALCULATED.3IONS-SCNC: 10 MMOL/L (ref 7–15)
AST SERPL W P-5'-P-CCNC: 57 U/L (ref 0–45)
BASOPHILS # BLD AUTO: 0 10E3/UL (ref 0–0.2)
BASOPHILS NFR BLD AUTO: 1 %
BILIRUB SERPL-MCNC: 0.3 MG/DL
BUN SERPL-MCNC: 8 MG/DL (ref 6–20)
CALCIUM SERPL-MCNC: 9.4 MG/DL (ref 8.6–10)
CHLORIDE SERPL-SCNC: 100 MMOL/L (ref 98–107)
CREAT SERPL-MCNC: 0.49 MG/DL (ref 0.51–0.95)
DEPRECATED HCO3 PLAS-SCNC: 24 MMOL/L (ref 22–29)
EGFRCR SERPLBLD CKD-EPI 2021: >90 ML/MIN/1.73M2
EOSINOPHIL # BLD AUTO: 0 10E3/UL (ref 0–0.7)
EOSINOPHIL NFR BLD AUTO: 1 %
ERYTHROCYTE [DISTWIDTH] IN BLOOD BY AUTOMATED COUNT: 13.9 % (ref 10–15)
GLUCOSE SERPL-MCNC: 549 MG/DL (ref 70–99)
HCT VFR BLD AUTO: 33.8 % (ref 35–47)
HGB BLD-MCNC: 11 G/DL (ref 11.7–15.7)
IMM GRANULOCYTES # BLD: 0 10E3/UL
IMM GRANULOCYTES NFR BLD: 0 %
LYMPHOCYTES # BLD AUTO: 0.7 10E3/UL (ref 0.8–5.3)
LYMPHOCYTES NFR BLD AUTO: 22 %
MCH RBC QN AUTO: 27.6 PG (ref 26.5–33)
MCHC RBC AUTO-ENTMCNC: 32.5 G/DL (ref 31.5–36.5)
MCV RBC AUTO: 85 FL (ref 78–100)
MONOCYTES # BLD AUTO: 0.3 10E3/UL (ref 0–1.3)
MONOCYTES NFR BLD AUTO: 8 %
NEUTROPHILS # BLD AUTO: 2.1 10E3/UL (ref 1.6–8.3)
NEUTROPHILS NFR BLD AUTO: 68 %
NRBC # BLD AUTO: 0 10E3/UL
NRBC BLD AUTO-RTO: 0 /100
PLATELET # BLD AUTO: 64 10E3/UL (ref 150–450)
POTASSIUM SERPL-SCNC: 4.4 MMOL/L (ref 3.4–5.3)
PROT SERPL-MCNC: 6.9 G/DL (ref 6.4–8.3)
RBC # BLD AUTO: 3.98 10E6/UL (ref 3.8–5.2)
SODIUM SERPL-SCNC: 134 MMOL/L (ref 135–145)
WBC # BLD AUTO: 3.2 10E3/UL (ref 4–11)

## 2024-06-14 NOTE — TELEPHONE ENCOUNTER
MD Yanelis Durán is calling and instructs FNA to phone patient to see how her blood sugar is.  FNA phoned patient and left voice mail that if blood sugar is high to go to urgent care and instructed to phone back if she has any questions.     6

## 2024-06-14 NOTE — TELEPHONE ENCOUNTER
Delayed documentation, on call provider    Paged by lab at 5 pm Friday reporting sugar of 500's from yesterdays lab .  Called patient earlier and left a message, asked Fna RN to call patient as well.  I called myself again and got patient.  Lab was from yesterday.  She is doing well, no new symptoms.  She is on meds and insulin.  She checked this am and was 300's.  She was instructed by provider to adjust her meds  She will go to urgent care if having symptoms  Will forward to pcp for further management  Yanelis Durán D.O.

## 2024-06-15 LAB
BACTERIA UR CULT: ABNORMAL
BACTERIA UR CULT: ABNORMAL

## 2024-06-17 LAB
HPV HR 12 DNA CVX QL NAA+PROBE: NEGATIVE
HPV16 DNA CVX QL NAA+PROBE: NEGATIVE
HPV18 DNA CVX QL NAA+PROBE: NEGATIVE
HUMAN PAPILLOMA VIRUS FINAL DIAGNOSIS: NORMAL

## 2024-06-19 ENCOUNTER — HOSPITAL ENCOUNTER (OUTPATIENT)
Dept: NUCLEAR MEDICINE | Facility: CLINIC | Age: 59
Setting detail: NUCLEAR MEDICINE
Discharge: HOME | End: 2024-06-19
Attending: FAMILY MEDICINE | Admitting: FAMILY MEDICINE
Payer: COMMERCIAL

## 2024-06-19 DIAGNOSIS — R68.81 EARLY SATIETY: ICD-10-CM

## 2024-06-19 LAB
BKR LAB AP GYN ADEQUACY: NORMAL
BKR LAB AP GYN INTERPRETATION: NORMAL
BKR LAB AP PREVIOUS ABNORMAL: NORMAL
PATH REPORT.COMMENTS IMP SPEC: NORMAL
PATH REPORT.COMMENTS IMP SPEC: NORMAL
PATH REPORT.RELEVANT HX SPEC: NORMAL

## 2024-06-19 PROCEDURE — 343N000001 HC RX 343: Performed by: FAMILY MEDICINE

## 2024-06-19 PROCEDURE — 78264 GASTRIC EMPTYING IMG STUDY: CPT | Mod: 26 | Performed by: RADIOLOGY

## 2024-06-19 PROCEDURE — 78264 GASTRIC EMPTYING IMG STUDY: CPT

## 2024-06-19 PROCEDURE — A9541 TC99M SULFUR COLLOID: HCPCS | Performed by: FAMILY MEDICINE

## 2024-06-19 RX ADMIN — Medication 2 MILLICURIE: at 08:28

## 2024-06-27 ENCOUNTER — THERAPY VISIT (OUTPATIENT)
Dept: OCCUPATIONAL THERAPY | Facility: CLINIC | Age: 59
End: 2024-06-27
Attending: FAMILY MEDICINE
Payer: COMMERCIAL

## 2024-06-27 ENCOUNTER — MYC REFILL (OUTPATIENT)
Dept: FAMILY MEDICINE | Facility: CLINIC | Age: 59
End: 2024-06-27

## 2024-06-27 DIAGNOSIS — G62.9 PERIPHERAL NEUROPATHY: ICD-10-CM

## 2024-06-27 DIAGNOSIS — E11.8 TYPE 2 DIABETES MELLITUS WITH COMPLICATION, WITH LONG-TERM CURRENT USE OF INSULIN (H): ICD-10-CM

## 2024-06-27 DIAGNOSIS — Z74.09 IMPAIRED MOBILITY AND ADLS: Primary | ICD-10-CM

## 2024-06-27 DIAGNOSIS — E11.42 DIABETIC POLYNEUROPATHY ASSOCIATED WITH TYPE 2 DIABETES MELLITUS (H): ICD-10-CM

## 2024-06-27 DIAGNOSIS — Z78.9 IMPAIRED MOBILITY AND ADLS: Primary | ICD-10-CM

## 2024-06-27 DIAGNOSIS — R26.81 UNSTABLE GAIT: ICD-10-CM

## 2024-06-27 DIAGNOSIS — Z79.4 TYPE 2 DIABETES MELLITUS WITH COMPLICATION, WITH LONG-TERM CURRENT USE OF INSULIN (H): ICD-10-CM

## 2024-06-27 LAB — NONINV COLON CA DNA+OCC BLD SCRN STL QL: NEGATIVE

## 2024-06-27 PROCEDURE — 97542 WHEELCHAIR MNGMENT TRAINING: CPT | Mod: GO | Performed by: OCCUPATIONAL THERAPIST

## 2024-06-27 NOTE — PROGRESS NOTES
"                                                                             SEATING AND WHEELED MOBILITY ASSESSMENT    [facility]  Occupational Therapy   Date of service: June 27, 2024    Referring provider:   Jose Sanchez DO     Order Date 6/7/24  Onset Date: 6/7/24    Order Details: jose alejandro starkey    Funding:[facility] medicare/ inspire/Waiver    Others present at visit:  Spouse / significant other  Personal care attendant / caregiver    Vendor: [employer]    Height/Weight: 5'3\" / 173    Medical diagnosis:   Nervous and Auditory  Chronic neck pain  Peripheral neuropathy     Respiratory  Mild persistent asthma without complication     Digestive  Fatty liver  Gastroesophageal reflux disease without esophagitis  Gastroparesis     Endocrine  Hyperlipidemia  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 (H)  Type 2 diabetes mellitus with complication, with long-term current use of insulin (H)     Circulatory  Essential hypertension     Musculoskeletal and Integumentary  Lower extremity edema  Posterior vitreous detachment, left eye     Urinary  Urge incontinence of urine     Behavioral  Adult attention deficit hyperactivity disorder  Generalized anxiety disorder with panic attacks  Severe episode of recurrent major depressive disorder, without psychotic features (H)     Other  History of laser photocoagulation of retina, os  Anisometropia  Amblyopia, mod, of left eye  Combined forms of age-related cataract, mild-mod, of both eyes     Cardio-respiratory status:  Inhaler and neb     Patient concerns/goals: Power mobility    Living environment:  Apartment    Living environment barriers:  None      Current assistance/living environment:  Lives with spouse    Community Mobility:  Transportation: [place], [place]  Community Mobility Requirements: Medical Appointments, Shopping, Restorationist    Current mobility equipment:  4 wheeled walker with seat    Fall Risk " Screen:   Has the patient fallen 2 or more times in the last year? No      Has the patient fallen and had an injury in the past year? No     25 ft walk: 11.32 seconds for 25 ft with four wheeled walker    Is the patient a fall risk? Yes, department fall risk interventions implemented    ADL:   Feeding self:  ind  Grooming: ind  UE Dressing:  sitting  LE Dressing:  sitting  Showering/bathing: shower chair with assist  Toileting/transfer:  grab bars with rts  Functional Mobility: 4ww    IADL:    Medications: ind will pill dispenser  Meals: shops with spouse but he will cook or she sits to aid  Home management:  PCA/spouse assist  Driving:  spouse    Services:   PCA: spouse in pca    Evaluation     Pain assessment:  Back, neck, hands and feet 7/10 in hands and 10 in neck    Cognition:  tangential     Vision: poor with glasses    Hearing: wnl    Fatigue:  Reported Problems:  limited endurance for community participation    Balance:  Unsupported Sitting Balance: Uses UE for Balance  Sitting Balance in Chair: Uses UE for Balance  Standing Balance: Uses UE for Balance    Ambulation:  Level of [place]: Independent  Assistive Device(s): Walker (four wheeled)    Transfers:   Needs supportive/solid/higher toilet    Neuromuscular:    Coordination:  Slowed throughout extremities    Sensation:  Sensory Deficits Reported: neuropathies in hands and feet to calves    Head and Neck:  Head and Neck Position: Flexed  Head Control: Adequate  Tone/Movement of Head and Neck: painful ranges    Upper Extremities  UE ROM: End ranges tight because you even tried that other either of similar  UE Strength:  4/5  Dominance: Left    Pelvis  Anterior/Posterior Pelvis Position: Partially Flexible, Posterior Tilt    Trunk:  Anterior/Posterior Trunk Position: Increased Thoracic Kyphosis    Lower Extremities:  LE ROM: WFL  LE Strength:  4-/5    Impairments:  Fatigue  Muscle atrophy  Coordination  Balance  Pain     Treatment diagnosis:  Impaired  mobility  Impaired activities of daily living     Recommendations/Plan of care:  Patient would benefit from interventions to enhance safety and independence.  Occupational therapy intervention for  wheelchair management.    Goals:   Target date:   Patient, family and/or caregiver will verbalize/demonstrate understanding of compensatory methods /equipment to enhance functional independence and safety.    Educational assessment/barriers to learning:  No barriers noted    Treatment provided this date:   Wheelchair management, 30 minutes    Educated on power chair vs scooter.  Safe trials of both.     After home trial best fit is power wheelchair.      This therapist has no financial connection to the equipment being requested or vendor being used.      I have read and concur with the above recommendations.    Physician Printed Name __________________________________________    Physician Signature  _____________________________________________    Date of Signature ______________________________    Physician Phone  ______________________________      Response to treatment/recommendations: receptive    Goal attainment:  All goals met    Risks and benefits of evaluation/treatment have been explained.  Patient, family and/or caregiver are in agreement with Plan of Care.     Timed Code Treatment Minutes: 30  Total Treatment Time (sum of timed and untimed services): 30    Electronically signed by:  Marlena CARREON/CASEY, ATP      Occupational Therapist, Assistive   [phone]      fax: [phone]      [email or web address], [place] [address]  June 27, 2024    Coverage Criteria Per Local Coverage Determination    A) Gillian has  mobility limitations due to chronic pain, neuropathy, asthma, dmii, and gastroparesis that significantly impairs her ability to participate in all of her mobility-related activities  of daily living (MRADL). Specifically affected are toileting (being able to get there in time to prevent accidents), dressing, and bathing (getting into the bathroom of designated place). Current equipment used is four wheeled walker. This patient needs the new equipment requested to be able to increase safe and ind participation in adls. Please see additional documentation in the seating and wheeled mobility report for details.   She had a successful clinical trial here, and also a successful trial at home with the recommended equipment. She is very willing and physically / cognitively able to use the recommended equipment to assist her with mobility related activities of daily living and general mobility.   B) Gillian's mobility limitation cannot be sufficiently and safely resolved by the use of an appropriately fitted cane or walker because she is limited due to pain and limited endurance. Distance and time to ambulate 25 ft in 11.32 seconds with walker. Strength of legs is 4-/5 for one maximal repetition. Fatigue also  impacts this patient's ability to ambulate, regardless of the gait aid.    C) Gillian does not have sufficient upper extremity function to self-propel an optimally-configured manual wheelchair in her home to perform MRADLs during a typical day due to limitations in strength, endurance, range of motion, and coordination. Distance and time to propel a light weight manual wheelchair NT due to pain.  Strength of arms is 4/5.    D)  Gillian is not able to use a POV/scooter because it will not fit in her home environment. er is unable to safely transfer to and from a POV,  unable to operate the tiller steering system, and unable to maintain postural stability and position while operating the POV. She needs more appropriate seating and positioning than any scooter seat provides.    E) The need for this equipment is LIFETIME.     RECOMMENDATIONS FOR MOBILITY BASE, SEATING SYSTEM AND COMPONENTS  Priscila ROSALES  - this mid wheel drive power wheelchair is needed for this patient to continue to have independent mobility and to be able to allow her to complete or assist in all of her mobility related activities of daily living (MRADLs). This wheelchair will also have the seating and positioning system and seat function she needs to be able to use and tolerate the wheelchair full time, and have functional and comfortable positioning for a full day's activities. Gillian has chronic pain, neuropathy, asthma, dmii, and gastroparesis which impairs her ability to move in her home without the use of the requested wheelchair. She lives in an apartment with level access and uses SUV for transportation.     2 Batteries and  - gel sealed, and two are necessary to power the wheelchair. They are maintenance free and are safe for travel on the road or in the air. They are necessary to provide reliable use of the power wheelchair on a single charge.     Swing away joystick mount - needed to be able to move the joystick out of the way during transfers, or when at the desksing the computer, or at the table eating, so as not to inadvertently hit the joystick, thus moving the wheelchair or turning the power on or off without her knowledge.     2 post, flip back, height adjustable, removable, full length armrests - needed for arm support at appropriate height, not available with standard armrests     This equipment is reasonable and necessary with reference to accepted standards of medical practice and treatment of this patient's condition and is not being recommended as a convenience item. Without this recommended equipment, she is highly likely to sustain injuries from falls, develop pressure sores or postural compensation, and/or be bed confined, which those costs far exceed the cost of the requested equipment.      Electronically signed by:  Marlena Manuel OTR/L, ATP      Occupational Therapist, Assistive Technology  Professional  [phone]      fax: [phone]      [email or web address], [address]  [place]   [address]  July 30, 2024

## 2024-06-28 ENCOUNTER — DOCUMENTATION ONLY (OUTPATIENT)
Dept: FAMILY MEDICINE | Facility: CLINIC | Age: 59
End: 2024-06-28

## 2024-06-28 ENCOUNTER — OFFICE VISIT (OUTPATIENT)
Dept: URGENT CARE | Facility: URGENT CARE | Age: 59
End: 2024-06-28
Payer: COMMERCIAL

## 2024-06-28 VITALS
BODY MASS INDEX: 29.94 KG/M2 | DIASTOLIC BLOOD PRESSURE: 75 MMHG | TEMPERATURE: 97.1 F | SYSTOLIC BLOOD PRESSURE: 145 MMHG | HEART RATE: 82 BPM | RESPIRATION RATE: 18 BRPM | WEIGHT: 169 LBS | OXYGEN SATURATION: 98 %

## 2024-06-28 DIAGNOSIS — M25.50 MULTIPLE JOINT PAIN: ICD-10-CM
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
## 2024-06-28 PROCEDURE — A4565 SLINGS: HCPCS | Performed by: PHYSICIAN ASSISTANT

## 2024-06-28 PROCEDURE — 99203 OFFICE O/P NEW LOW 30 MIN: CPT | Performed by: PHYSICIAN ASSISTANT

## 2024-06-28 RX ORDER — GABAPENTIN 600 MG/1
600 TABLET ORAL 3 TIMES DAILY
Qty: 90 TABLET | Refills: 3 | Status: SHIPPED | OUTPATIENT
Start: 2024-06-28

## 2024-06-28 RX ORDER — NAPROXEN 250 MG/1
250 TABLET ORAL 2 TIMES DAILY WITH MEALS
COMMUNITY

## 2024-06-28 NOTE — PROGRESS NOTES
Medicare requires that the MD/DO treating the patient for diabetes answers all of the questions and signs the pended order for the patient to qualify for diabetic shoes. Once the order is completed, please route the encounter back to sender.    Victor Manuel Alexander

## 2024-06-29 NOTE — PROGRESS NOTES
SUBJECTIVE:   Gillian Camara is a 58 year old female presenting with a chief complaint of   Chief Complaint   Patient presents with    Urgent Care    Fall     Per patient states she fell yesterday while trying to use the restroom due to balance of her neuropathy now having lower back pain, left side pain, and left elbow        She is a new patient of [facility].  Patient presents with bilateral elbow pain after patient fell during the night to use the restroom.  Hx of neuropathy.  Bilateral ankle pain, lower back pain.  Left hand dominant.  Requests arm sling    Treatment:  naproxyn BID.      Review of Systems    Past Medical History:   Diagnosis Date    Amblyopia      Family History   Problem Relation Age of Onset    Diabetes Sister     Retinitis pigmentosa Sister      Current Outpatient Medications   Medication Sig Dispense Refill    albuterol (PROAIR HFA/PROVENTIL HFA/VENTOLIN HFA) 108 (90 Base) MCG/ACT inhaler Inhale 1-2 puffs into the lungs every 4 hours as needed for wheezing 18 g 11    ammonium lactate (LAC-HYDRIN) 12 % external lotion Apply topically as needed for dry skin 500 g 11    atorvastatin (LIPITOR) 80 MG tablet Take 1 tablet (80 mg) by mouth daily 90 tablet 3    clonazePAM (KLONOPIN) 0.5 MG tablet Take 1 tablet (0.5 mg) by mouth 2 times daily as needed for anxiety 60 tablet 5    clotrimazole (LOTRIMIN) 1 % external solution Apply topically 2 times daily 60 mL 11    ezetimibe (ZETIA) 10 MG tablet Take 1 tablet (10 mg) by mouth daily 90 tablet 3    famotidine (PEPCID) 20 MG tablet Take 1 tablet (20 mg) by mouth 2 times daily 180 tablet 3    fluticasone (ARNUITY ELLIPTA) 200 MCG/ACT inhaler Inhale 1 puff into the lungs at bedtime 30 each 11    fluticasone (FLONASE) 50 MCG/ACT nasal spray Spray 1 spray in nostril daily as needed for allergies 16 g 11    gabapentin (NEURONTIN) 600 MG tablet Take 1 tablet (600 mg) by mouth 3 times daily 90 tablet 3    hydrochlorothiazide (MICROZIDE) 12.5 MG capsule Take 1  capsule (12.5 mg) by mouth every morning 90 capsule 1    insulin aspart (NOVOLOG FLEXPEN) 100 UNIT/ML pen Inject 90 Units Subcutaneous 3 times daily (before meals)      insulin glargine (LANTUS SOLOSTAR) 100 UNIT/ML pen Inject 110 Units Subcutaneous 2 times daily      insulin pen needle (32G X 6 MM) 32G X 6 MM miscellaneous Use 4 pen needles daily or as directed. 120 each 11    liraglutide (VICTOZA PEN) 18 MG/3ML solution Inject 0.6 mg Subcutaneous daily      losartan (COZAAR) 50 MG tablet Take 1 tablet (50 mg) by mouth daily 90 tablet 3    meclizine (ANTIVERT) 25 MG tablet Take 1 tablet (25 mg) by mouth daily as needed for dizziness (or motion sickness) 30 tablet 1    metFORMIN (GLUCOPHAGE XR) 500 MG 24 hr tablet Take 1 tablet (500 mg) by mouth 2 times daily (with meals) 180 tablet 3    naproxen (NAPROSYN) 250 MG tablet Take 250 mg by mouth 2 times daily (with meals)      omeprazole (PRILOSEC) 20 MG DR capsule Take 1 capsule (20 mg) by mouth daily before breakfast 90 capsule 1    pramipexole (MIRAPEX) 0.25 MG tablet Take 2 tablets (0.5 mg) by mouth at bedtime 180 tablet 3    sertraline (ZOLOFT) 100 MG tablet Take 2 tablets (200 mg) by mouth daily 180 tablet 3    triamcinolone (NASACORT) 55 MCG/ACT nasal aerosol Spray 2 sprays into both nostrils daily 16.9 mL 11    zinc oxide (DESITIN) 20 % external ointment Apply topically 6 times daily 500 g 5     Social History     Tobacco Use    Smoking status: Never     Passive exposure: Never    Smokeless tobacco: Never   Substance Use Topics    Alcohol use: Not on file       OBJECTIVE  BP (!) 145/75   Pulse 82   Temp 97.1  F (36.2  C) (Tympanic)   Resp 18   Wt 76.7 kg (169 lb)   LMP 10/01/2013 (Approximate)   SpO2 98%   BMI 29.94 kg/m      Physical Exam  Vitals reviewed.   Constitutional:       Appearance: Normal appearance. She is obese.   Eyes:      Extraocular Movements: Extraocular movements intact.      Conjunctiva/sclera: Conjunctivae normal.   Cardiovascular:       Rate and Rhythm: Normal rate.   Musculoskeletal:      Comments: Bilateral elbow, ankle, hands with normal ROM.  No ecchymosis.  Tenderness to the bony spine.  Generalized tenderness to palpation of elbows, hands, ankles, left lower back.  Ambulation with walker normal.   Neurological:      General: No focal deficit present.      Mental Status: She is alert.   Psychiatric:         Mood and Affect: Mood normal.         Labs:  No results found for this or any previous visit (from the past 24 hour(s)).    ASSESSMENT:    No diagnosis found.     Medical Decision Making:    Differential Diagnosis:  Contusion, falls, sprains    Serious Comorbid Conditions:  Adult:   reviewed    PLAN:    Patient given arm sling.  Not to be used while using walker.  Continue current medications.  Order for PT.      Followup:    If not improving or if condition worsens, follow up with your Primary Care Provider, If not improving or if conditions worsens over the next 12-24 hours, go to the Emergency Department    There are no Patient Instructions on file for this visit.

## 2024-07-02 ENCOUNTER — TELEPHONE (OUTPATIENT)
Dept: FAMILY MEDICINE | Facility: CLINIC | Age: 59
End: 2024-07-02

## 2024-07-02 NOTE — TELEPHONE ENCOUNTER
"Patient calling to relay information from wheelchair company, she got order for wheelchair at 6/27 visit, and they sent her guidelines that need to be completed via email mainly saying they need progress notes sent over. Relayed fax number to pt for Marlena Manuel to have guidelines sent over, and she wanted to also relay scooter will not work and requesting \"chair with joystick\".    She also wanted to relay that she had gone to  on 6/28 for fall where she fell primarily on elbows requiring arm sling.    Routing to OT pt saw who I assume needs to be notified of this in order to help with form and get progress note sent over.    Thanks!  Israel Vega RN   [facility]    "

## 2024-07-02 NOTE — PATIENT INSTRUCTIONS
"Patient Education   Preventive Care Advice   This is general advice we often give to help people stay healthy. Your care team may have specific advice just for you. Please talk to your care team about your own preventive care needs.  Lifestyle  Exercise at least 150 minutes each week (30 minutes a day, 5 days a week).  Do muscle strengthening activities 2 days a week. These help control your weight and prevent disease.  No smoking.  Wear sunscreen to prevent skin cancer.  Have your home tested for radon every 2 to 5 years. Radon is a colorless, odorless gas that can harm your lungs. To learn more, go to www.health.[facility].[place].us and search for \"Radon in Homes.\"  Keep guns unloaded and locked up in a safe place like a safe or gun vault, or, use a gun lock and hide the keys. Always lock away bullets separately. To learn more, visit [employer].[place].gov and search for \"safe gun storage.\"  Nutrition  Eat 5 or more servings of fruits and vegetables each day.  Try wheat bread, brown rice and whole grain pasta (instead of white bread, rice, and pasta).  Get enough calcium and vitamin D. Check the label on foods and aim for 100% of the RDA (recommended daily allowance).  Regular exams  Have a dental exam and cleaning every 6 months.  See your health care team every year to talk about:  Any changes in your health.  Any medicines your care team has prescribed.  Preventive care, family planning, and ways to prevent chronic diseases.  Shots (vaccines)   HPV shots (up to age 26), if you've never had them before.  Hepatitis B shots (up to age 59), if you've never had them before.  COVID-19 shot: Get this shot when it's due.  Flu shot: Get a flu shot every year.  Tetanus shot: Get a tetanus shot every 10 years.  Pneumococcal, hepatitis A, and RSV shots: Ask your care team if you need these based on your risk.  Shingles shot (for age 50 and up).  General health tests  Diabetes screening:  Starting at age 35, Get screened for diabetes at least " every 3 years.  If you are younger than age 35, ask your care team if you should be screened for diabetes.  Cholesterol test: At age 39, start having a cholesterol test every 5 years, or more often if advised.  Bone density scan (DEXA): At age 50, ask your care team if you should have this scan for osteoporosis (brittle bones).  Hepatitis C: Get tested at least once in your life.  Abdominal aortic aneurysm screening: Talk to your doctor about having this screening if you:  Have ever smoked; and  Are biologically male; and  Are between the ages of 65 and 75.  STIs (sexually transmitted infections)  Before age 24: Ask your care team if you should be screened for STIs.  After age 24: Get screened for STIs if you're at risk. You are at risk for STIs (including HIV) if:  You are sexually active with more than one person.  You don't use condoms every time.  You or a partner was diagnosed with a sexually transmitted infection.  If you are at risk for HIV, ask about PrEP medicine to prevent HIV.  Get tested for HIV at least once in your life, whether you are at risk for HIV or not.  Cancer screening tests  Cervical cancer screening: If you have a cervix, begin getting regular cervical cancer screening tests at age 21. Most people who have regular screenings with normal results can stop after age 65. Talk about this with your provider.  Breast cancer scan (mammogram): If you've ever had breasts, begin having regular mammograms starting at age 40. This is a scan to check for breast cancer.  Colon cancer screening: It is important to start screening for colon cancer at age 45.  Have a colonoscopy test every 10 years (or more often if you're at risk) Or, ask your provider about stool tests like a FIT test every year or Cologuard test every 3 years.  To learn more about your testing options, visit: www.[employer]/107509.pdf.  For help making a decision, visit: tim/yz26539.  Prostate cancer screening test: If you have a  prostate and are age 55 to 69, ask your provider if you would benefit from a yearly prostate cancer screening test.  Lung cancer screening: If you are a current or former smoker age 50 to 80, ask your care team if ongoing lung cancer screenings are right for you.  For informational purposes only. Not to replace the advice of your health care provider. Copyright   2023 [facility]. All rights reserved. Clinically reviewed by the [facility] Transitions Program. [employer] [id number] - REV 04/24.  Learning About Depression Screening  What is depression screening?  Depression screening is a way to see if you have depression symptoms. It may be done by a doctor or counselor. It's often part of a routine checkup. That's because your mental health is just as important as your physical health.  Depression is a mental health condition that affects how you feel, think, and act. You may:  Have less energy.  Lose interest in your daily activities.  Feel sad and grouchy for a long time.  Depression is very common. It affects people of all ages.  Many things can lead to depression. Some people become depressed after they have a stroke or find out they have a major illness like cancer or heart disease. The death of a loved one or a breakup may lead to depression. It can run in families. Most experts believe that a combination of inherited genes and stressful life events can cause it.  What happens during screening?  You may be asked to fill out a form about your depression symptoms. You and the doctor will discuss your answers. The doctor may ask you more questions to learn more about how you think, act, and feel.  What happens after screening?  If you have symptoms of depression, your doctor will talk to you about your options.  Doctors usually treat depression with medicines or counseling. Often, combining the two works best. Many people don't get help because they think that they'll get over the depression on  "their own. But people with depression may not get better unless they get treatment.  The cause of depression is not well understood. There may be many factors involved. But if you have depression, it's not your fault.  A serious symptom of depression is thinking about death or suicide. If you or someone you care about talks about this or about feeling hopeless, get help right away.  It's important to know that depression can be treated. Medicine, counseling, and self-care may help.  Where can you learn more?  Go to https://www.[employer].net/patiented  Enter T185 in the search box to learn more about \"Learning About Depression Screening.\"  Current as of: June 24, 2023               Content Version: 14.0    9719-9224 [employer].   Care instructions adapted under license by your healthcare professional. If you have questions about a medical condition or this instruction, always ask your healthcare professional. [employer], [employer] disclaims any warranty or liability for your use of this information.         "

## 2024-07-03 DIAGNOSIS — K21.9 GASTROESOPHAGEAL REFLUX DISEASE, UNSPECIFIED WHETHER ESOPHAGITIS PRESENT: ICD-10-CM

## 2024-07-03 DIAGNOSIS — I10 ESSENTIAL HYPERTENSION: ICD-10-CM

## 2024-07-03 RX ORDER — HYDROCHLOROTHIAZIDE 12.5 MG/1
12.5 CAPSULE ORAL EVERY MORNING
Qty: 90 CAPSULE | Refills: 1 | OUTPATIENT
Start: 2024-07-03

## 2024-07-08 ENCOUNTER — TELEPHONE (OUTPATIENT)
Dept: FAMILY MEDICINE | Facility: CLINIC | Age: 59
End: 2024-07-08
Payer: COMMERCIAL

## 2024-07-08 NOTE — TELEPHONE ENCOUNTER
Reason for Call (Form)    Goal: Our goal is to have forms completed within 72 hours. However, some forms may require a visit or additional information.    Type of Letter, Form, or Note: ICD 10 diagnosis form    Form Origin: [facility]    Received From: Summer Merritt    Placed At: [facility]    Provider: Dr. Sanchez    Additional Comments:

## 2024-07-11 ENCOUNTER — THERAPY VISIT (OUTPATIENT)
Dept: PHYSICAL THERAPY | Facility: CLINIC | Age: 59
End: 2024-07-11
Attending: PHYSICIAN ASSISTANT
Payer: COMMERCIAL

## 2024-07-11 DIAGNOSIS — M25.512 ACUTE PAIN OF LEFT SHOULDER: ICD-10-CM

## 2024-07-11 DIAGNOSIS — M54.50 ACUTE BILATERAL LOW BACK PAIN WITHOUT SCIATICA: ICD-10-CM
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
## 2024-07-11 DIAGNOSIS — M54.2 ACUTE NECK PAIN: Primary | ICD-10-CM

## 2024-07-11 DIAGNOSIS — M25.50 MULTIPLE JOINT PAIN: ICD-10-CM

## 2024-07-11 PROCEDURE — 97161 PT EVAL LOW COMPLEX 20 MIN: CPT | Mod: GP | Performed by: PHYSICAL THERAPIST

## 2024-07-11 PROCEDURE — 97110 THERAPEUTIC EXERCISES: CPT | Mod: GP | Performed by: PHYSICAL THERAPIST

## 2024-07-11 ASSESSMENT — ACTIVITIES OF DAILY LIVING (ADL)
LAUNDERING_CLOTHES: EXTREME DIFFICULTY
OPENING_A_JAR: EXTREME DIFFICULTY
THROWING_A_BALL: EXTREME DIFFICULTY
PLEASE_INDICATE_YOR_PRIMARY_REASON_FOR_REFERRAL_TO_THERAPY:: ELBOW
PUSHING_UP_ON_YOUR_HANDS: EXTREME DIFFICULTY
SLEEPING: NO DIFFICULTY
OPENING_DOORS: EXTREME DIFFICULTY
PLACING_AN_OBJECT_ONTO,_OR_REMOVING_IT_FROM_AN_OVERHEAD_SHELF: EXTREME DIFFICULTY
UEFI_TOTAL_SCORE: 14
CARRYING_A_SMALL_SUITCASE_WITH_YOUR_AFFECTED_LIMB: EXTREME DIFFICULTY
USING_TOOLS_OR_APPLIANCES: EXTREME DIFFICULTY
YOUR_USUAL_HOBBIES,_RECREATIONAL_OR_SPORTING_ACTIVITIES: QUITE A BIT OF DIFFICULTY
DOING_UP_BUTTONS: QUITE A BIT OF DIFFICULTY
UEFI_TOTAL_SCORE/80: .18
TYING_OR_LACING_SHOES: MODERATE DIFFICULTY
DRESS: EXTREME DIFFICULTY
WASHING_YOUR_HAIR_OR_SCALP: QUITE A BIT OF DIFFICULTY
VACUUMING,_SWEEPING,_OR_RAKING: EXTREME DIFFICULTY
LIFTING_A_BAG_OF_GROCERIES_TO_WAIST_LEVEL: EXTREME DIFFICULTY
DRIVING: NO DIFFICULTY
PREPARING_FOOD: QUITE A BIT OF DIFFICULTY
ANY_OF_YOUR_USUAL_WORK,_HOUSEWORK_OR_SCHOOL_ACTIVITIES: QUITE A BIT OF DIFFICULTY
CLEANING: EXTREME DIFFICULTY

## 2024-07-11 NOTE — PROGRESS NOTES
PHYSICAL THERAPY EVALUATION  Type of Visit: Evaluation       Fall Risk Screen:  Fall screen completed by: PT  Have you fallen 2 or more times in the past year?: No  Have you fallen and had an injury in the past year?: Yes  Timed Up and Go score (seconds): 17  Is patient a fall risk?: Yes; Department fall risk interventions implemented  Fall screen comments: the patient currently has an order for unsteadiness of gait, I assisted the patient by telling how to get ahold of the [facility] to aid with her balance    Subjective       Presenting condition or subjective complaint: l fell down i t tai and landed my left side. left side hurts. The patient fell in her restroom, tried to catch herself with a grab bar but twisted and hit the wall, since then her neck, left shoulder, and low back have been hurting. She notes that these areas have been painful for a long time but the fall has flared her pain. Notes the pain bilaterally but states the pain is worse on the left side of her body    Date of onset: 06/28/24    Relevant medical history:   diabetes, neuropathy, cardiac murmurs, chronic pain  Dates & types of surgery: right elbow syrgury    Prior diagnostic imaging/testing results:     x-ray left shoulder 1 year ago: Multiple views of the left shoulder. No fracture or dislocation. Moderate degenerative irregularity in the AC joint, mild degenerative irregularity in the glenohumeral joint. Possible interarticular joint body. Amorphous calcifications near the greater tuberosity may represent calcific tendinitis.   Prior therapy history for the same diagnosis, illness or injury: No        Living Environment  Social support: With a significant other or spouse   Type of home: Apartment/condo   Stairs to enter the home: Yes       Ramp: No   Stairs inside the home: No       Help at home: Self Cares (home health aide/personal care attendant, family, etc); Home management tasks (cooking, cleaning);  Medication and/or finances; Assist for driving and community activities; Meals on wheels/meal service; Emergency call system  Equipment owned: Straight Cane; Four-point cane; Walker with wheels; Lift chair     Employment: No    Hobbies/Interests: reading [employer] dinig out movies travelling    Patient goals for therapy: use my left arm hands more    Pain assessment:      Objective   EVALUATION  Cervical AROM  Flexion 53 painful on front of neck  Extension 16   Side bend R: 19 L:15  Rotation R: 29 L 36  MMT: 4/5 with testing of flexion, extension, bilateral side bending    L shoulder   Flexion 104  Abduction 106  Extension: 67   IR: to L PSIS  ER: to L Ear  MMT: all 4/5 with the exception of ER which is 4-/5 and painful    Elbow  Flexion 109  Extension: 2 degrees hyperextension  Supination WNL  Pronation WNL    Lumbar spine AROM  Flexion: fingertips reach to floor, comfortable stretch  Extension: mod loss, painful pinching on sore areas  MMT: hip extension bilaterally 4-/5, hip abduction 4/5 bilaterally, hip flexion bilaterally 4/5    PAIN: Pain Level at Rest: 7/10  Pain Level with Use: 10/10  Pain Quality: Aching, Dull, and Shooting  Pain is Exacerbated By: car transfers, chair transfers, bed transfers< walking  Pain is Relieved By: stretch and massage  Pain Progression: Improved  POSTURE: Standing Posture: Rounded shoulders, Forward head, Lordosis decreased, Thoracic kyphosis increased  GAIT:   Weightbearing Status:   Assistive Device(s): Walker (four wheeled)  Gait Deviations: Base of support decreased  Stance time decreased  Stride length decreased  BALANCE/PROPRIOCEPTION:  TUs  DERMATOMES:    Left Right   T12      L1     L2     L3     L4 Hypo (light touch) Hypo (light touch)   L5 Hypo (light touch) Hypo (light touch)   S1 Hypo (light touch) Hypo (light touch)     PELVIS/SI SPECIAL TESTS: WNL    Assessment & Plan   CLINICAL IMPRESSIONS  Medical Diagnosis: Fall, initial encounter  Multiple joint pain     Treatment Diagnosis: Acute neck pain, Acute left shoulder pain, acute bilateral low back pain   Impression/Assessment: Patient is a 58 year old female with acute neck pain, left shoulder pain, bilateral low back pain complaints.  The following significant findings have been identified: Pain, Decreased ROM/flexibility, Decreased strength, Impaired balance, Impaired gait, Decreased activity tolerance, and Impaired posture. These impairments interfere with their ability to perform self care tasks, recreational activities, household chores, household mobility, and community mobility as compared to previous level of function.     Clinical Decision Making (Complexity):  Clinical Presentation: Stable/Uncomplicated  Clinical Presentation Rationale: based on medical and personal factors listed in PT evaluation  Clinical Decision Making (Complexity): Low complexity    PLAN OF CARE  Treatment Interventions:  Interventions: Gait Training, Manual Therapy, Neuromuscular Re-education, Therapeutic Activity, Therapeutic Exercise    Long Term Goals     PT Goal 1  Goal Identifier: transfers  Goal Description: the patient will be able to independently perform a sit<>stand transfer from a chair and a supine to standing transfer from a bed  Rationale: to maximize safety and independence with performance of ADLs and functional tasks;to maximize safety and independence within the home;to maximize safety and independence within the community  Goal Progress: currently requires minAx1 for transfers  Target Date: 09/19/24      Frequency of Treatment: 1x daily per week for 6 weeks tapering to 1x every other week for 4 weeks  Duration of Treatment: 10 weeks    Recommended Referrals to Other Professionals:   Education Assessment:        Risks and benefits of evaluation/treatment have been explained.   Patient/Family/caregiver agrees with Plan of Care.     Evaluation Time:     PT Eval, Low Complexity Minutes (47969): 32       Signing Clinician:  EUGENE BEAN [facility]                                                                                   OUTPATIENT PHYSICAL THERAPY      PLAN OF TREATMENT FOR OUTPATIENT REHABILITATION   Patient's Last Name, First Name, Gillian Galvez YOB: 1965   Provider's Name   [facility]   Medical Record No.  [id number]     Onset Date: 06/28/24  Start of Care Date: 07/11/24     Medical Diagnosis:  Fall, initial encounter  Multiple joint pain      PT Treatment Diagnosis:  Acute neck pain, Acute left shoulder pain, acute bilateral low back pain Plan of Treatment  Frequency/Duration: 1x daily per week for 6 weeks tapering to 1x every other week for 4 weeks/ 10 weeks    Certification date from 07/11/24 to 09/19/24         See note for plan of treatment details and functional goals     EUGENE SOUTH                         I CERTIFY THE NEED FOR THESE SERVICES FURNISHED UNDER        THIS PLAN OF TREATMENT AND WHILE UNDER MY CARE     (Physician attestation of this document indicates review and certification of the therapy plan).              Referring Provider:  RAMESH Zuniga    Initial Assessment  See Epic Evaluation- Start of Care Date: 07/11/24

## 2024-07-12 ENCOUNTER — TELEPHONE (OUTPATIENT)
Dept: FAMILY MEDICINE | Facility: CLINIC | Age: 59
End: 2024-07-12

## 2024-07-12 ENCOUNTER — ANCILLARY PROCEDURE (OUTPATIENT)
Dept: CARDIOLOGY | Facility: CLINIC | Age: 59
End: 2024-07-12
Attending: FAMILY MEDICINE
Payer: COMMERCIAL

## 2024-07-12 ENCOUNTER — MEDICAL CORRESPONDENCE (OUTPATIENT)
Dept: HEALTH INFORMATION MANAGEMENT | Facility: CLINIC | Age: 59
End: 2024-07-12

## 2024-07-12 DIAGNOSIS — R01.1 UNDIAGNOSED CARDIAC MURMURS: ICD-10-CM

## 2024-07-12 PROBLEM — M54.50 ACUTE BILATERAL LOW BACK PAIN WITHOUT SCIATICA: Status: ACTIVE | Noted: 2024-07-12

## 2024-07-12 PROBLEM — M54.2 ACUTE NECK PAIN: Status: ACTIVE | Noted: 2024-07-12

## 2024-07-12 PROBLEM — M25.512 ACUTE PAIN OF LEFT SHOULDER: Status: ACTIVE | Noted: 2024-07-12

## 2024-07-12 LAB — LVEF ECHO: NORMAL

## 2024-07-12 NOTE — TELEPHONE ENCOUNTER
Called orthotics back and they are going to fax over sheet with questions to then be faxed back to them.    TC: Please get to Dr. Sanchez when able to,     Thanks!  Israel Vega RN   [facility]

## 2024-07-12 NOTE — TELEPHONE ENCOUNTER
I don't see the questions in the 6/28 encounter.  I am not sure what I need to answer    Jose Sanchez, DO

## 2024-07-12 NOTE — TELEPHONE ENCOUNTER
[facility] calling, see 6/28/24 note, medicare requires the provider treating the patient answers all of the questions and signs the pended order for the patient to qualify for diabetic shoes.     Questions are in 6/28 encounter and they just need those done as they already have the order for the shoes.    Callback: [phone] extension 2, if not able to open or do questions    Thanks!  Israel Vega RN   [facility]

## 2024-07-16 ENCOUNTER — TELEPHONE (OUTPATIENT)
Dept: FAMILY MEDICINE | Facility: CLINIC | Age: 59
End: 2024-07-16

## 2024-07-16 ENCOUNTER — MYC MEDICAL ADVICE (OUTPATIENT)
Dept: FAMILY MEDICINE | Facility: CLINIC | Age: 59
End: 2024-07-16

## 2024-07-16 ENCOUNTER — ANCILLARY PROCEDURE (OUTPATIENT)
Dept: GENERAL RADIOLOGY | Facility: CLINIC | Age: 59
End: 2024-07-16
Attending: PHYSICIAN ASSISTANT
Payer: COMMERCIAL

## 2024-07-16 ENCOUNTER — TELEPHONE (OUTPATIENT)
Dept: FAMILY MEDICINE | Facility: CLINIC | Age: 59
End: 2024-07-16
Payer: COMMERCIAL

## 2024-07-16 ENCOUNTER — OFFICE VISIT (OUTPATIENT)
Dept: URGENT CARE | Facility: URGENT CARE | Age: 59
End: 2024-07-16
Payer: COMMERCIAL

## 2024-07-16 VITALS
SYSTOLIC BLOOD PRESSURE: 109 MMHG | OXYGEN SATURATION: 98 % | TEMPERATURE: 98.9 F | BODY MASS INDEX: 29.58 KG/M2 | WEIGHT: 167 LBS | HEART RATE: 86 BPM | RESPIRATION RATE: 16 BRPM | DIASTOLIC BLOOD PRESSURE: 62 MMHG

## 2024-07-16 DIAGNOSIS — M25.561 ACUTE PAIN OF RIGHT KNEE: ICD-10-CM

## 2024-07-16 DIAGNOSIS — M25.561 ACUTE PAIN OF RIGHT KNEE: Primary | ICD-10-CM

## 2024-07-16 DIAGNOSIS — Z23 NEED FOR TETANUS BOOSTER: ICD-10-CM

## 2024-07-16 DIAGNOSIS — M54.50 ACUTE RIGHT-SIDED LOW BACK PAIN WITHOUT SCIATICA: ICD-10-CM

## 2024-07-16 DIAGNOSIS — S80.211A ABRASION OF RIGHT KNEE, INITIAL ENCOUNTER: ICD-10-CM
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
## 2024-07-16 DIAGNOSIS — F41.1 GAD (GENERALIZED ANXIETY DISORDER): ICD-10-CM

## 2024-07-16 PROCEDURE — 73562 X-RAY EXAM OF KNEE 3: CPT | Mod: TC | Performed by: RADIOLOGY

## 2024-07-16 PROCEDURE — 99214 OFFICE O/P EST MOD 30 MIN: CPT | Performed by: PHYSICIAN ASSISTANT

## 2024-07-16 PROCEDURE — 72100 X-RAY EXAM L-S SPINE 2/3 VWS: CPT | Mod: TC | Performed by: RADIOLOGY

## 2024-07-16 NOTE — TELEPHONE ENCOUNTER
Pending Prescriptions:                       Disp   Refills    clonazePAM (KLONOPIN) 0.5 MG tablet       60 tab*5            Sig: Take 1 tablet (0.5 mg) by mouth 2 times daily as           needed for anxiety

## 2024-07-16 NOTE — TELEPHONE ENCOUNTER
Patient requesting a refill of fluconazole (DIFLUCAN) 150 MG tablet, 1 tablet every 3 days, for 3 doses. Rx is no longer active as of 06/20/24.

## 2024-07-16 NOTE — TELEPHONE ENCOUNTER
Sent mychart msg regarding need for fluconazole refill and if patient is currently symptomatic     Advised Evisit if symptomatic    Thanks   Ciarra LION RN

## 2024-07-16 NOTE — PROGRESS NOTES
Chief Complaint   Patient presents with    Fall     Onset: today: Pt fell coming out of a gas station. Pt landed on her right side, pain on the R-side and lower back.    X-rays-I do not see any knee or lumbar spine fractures.    Results for orders placed or performed in visit on 07/16/24   XR Knee Right 3 Views     Status: None    Narrative    XR KNEE RIGHT 3 VIEWS 7/16/2024 12:15 PM    HISTORY: Acute pain of right knee; Abrasion of right knee, initial  encounter; Acute right-sided low back pain without sciatica; Need for  tetanus booster    COMPARISON: None.      Impression    IMPRESSION: No fracture or foreign body. No joint effusion.  MARITA LINCOLN MD         SYSTEM ID:  [id number]   Results for orders placed or performed in visit on 07/16/24   XR Lumbar Spine 2/3 Views     Status: None    Narrative    XR LUMBAR SPINE 2/3 VIEWS  7/16/2024 12:08 PM     HISTORY: Acute pain of right knee; Abrasion of right knee, initial  encounter; Acute right-sided low back pain without sciatica; Need for  tetanus booster    COMPARISON: None.       Impression    IMPRESSION: Alignment of the lumbar spine is within normal limits. No  loss of vertebral body height. Multilevel degenerative changes with  loss of disc height, osteophyte formation and facet arthropathy.    MATILDA DELACRUZ MD         SYSTEM ID:  [id number]             Impression:     ICD-10-CM    1. Acute pain of right knee  M25.561 XR Lumbar Spine 2/3 Views     XR Knee Right 3 Views      2. Abrasion of right knee, initial encounter  S80.211A XR Lumbar Spine 2/3 Views     XR Knee Right 3 Views      3. Acute right-sided low back pain without sciatica  M54.50 XR Lumbar Spine 2/3 Views     XR Knee Right 3 Views      4. Fall, initial encounter  W19.XXXA           Plan: Right knee abrasion.  Bacitracin, Telfa, gauze, Coban applied.  Watch for evidence of infection.  Patient will check with her primary care provider to see when her last tetanus shot was.  If over 5 years  ago will get tetanus through the pharmacist as Medicare would then cover it.  Symptomatic treatment back and knee pain.  Ice.  Tylenol.  Instructions for back care and return precautions discussed.    Follow-up with primary care provider if any concerns or not improving.    Tamara Sosa PA-C      SUBJECTIVE:    58-year-old female who uses a walker fell coming out of the gas station today.  She did not have her walker and did not see that there was a curb and fell on her right side.  She reports right knee pain and right sided low back/hip pain.  Did not hit her head.  Sustained an abrasion on her knee.  Believes she had a tetanus shot 2 years ago through her primary care provider at [facility].  She will check on this.        Allergies   Allergen Reactions    Lisinopril Cough       Past Medical History:   Diagnosis Date    Amblyopia        Current Outpatient Medications   Medication Sig Dispense Refill    atorvastatin (LIPITOR) 80 MG tablet Take 1 tablet (80 mg) by mouth daily 90 tablet 3    clotrimazole (LOTRIMIN) 1 % external solution Apply topically 2 times daily 60 mL 11    ezetimibe (ZETIA) 10 MG tablet Take 1 tablet (10 mg) by mouth daily 90 tablet 3    famotidine (PEPCID) 20 MG tablet Take 1 tablet (20 mg) by mouth 2 times daily 180 tablet 3    fluticasone (ARNUITY ELLIPTA) 200 MCG/ACT inhaler Inhale 1 puff into the lungs at bedtime 30 each 11    fluticasone (FLONASE) 50 MCG/ACT nasal spray Spray 1 spray in nostril daily as needed for allergies 16 g 11    gabapentin (NEURONTIN) 600 MG tablet Take 1 tablet (600 mg) by mouth 3 times daily 90 tablet 3    hydrochlorothiazide (MICROZIDE) 12.5 MG capsule Take 1 capsule (12.5 mg) by mouth every morning 90 capsule 1    insulin aspart (NOVOLOG FLEXPEN) 100 UNIT/ML pen Inject 90 Units Subcutaneous 3 times daily (before meals)      insulin glargine (LANTUS SOLOSTAR) 100 UNIT/ML pen Inject 110 Units Subcutaneous 2 times daily      liraglutide (VICTOZA PEN) 18  MG/3ML solution Inject 0.6 mg Subcutaneous daily      losartan (COZAAR) 50 MG tablet Take 1 tablet (50 mg) by mouth daily 90 tablet 3    metFORMIN (GLUCOPHAGE XR) 500 MG 24 hr tablet Take 1 tablet (500 mg) by mouth 2 times daily (with meals) 180 tablet 3    naproxen (NAPROSYN) 250 MG tablet Take 250 mg by mouth 2 times daily (with meals)      omeprazole (PRILOSEC) 20 MG DR capsule Take 1 capsule (20 mg) by mouth daily before breakfast 90 capsule 1    pramipexole (MIRAPEX) 0.25 MG tablet Take 2 tablets (0.5 mg) by mouth at bedtime 180 tablet 3    sertraline (ZOLOFT) 100 MG tablet Take 2 tablets (200 mg) by mouth daily 180 tablet 3    zinc oxide (DESITIN) 20 % external ointment Apply topically 6 times daily 500 g 5    albuterol (PROAIR HFA/PROVENTIL HFA/VENTOLIN HFA) 108 (90 Base) MCG/ACT inhaler Inhale 1-2 puffs into the lungs every 4 hours as needed for wheezing 18 g 11    ammonium lactate (LAC-HYDRIN) 12 % external lotion Apply topically as needed for dry skin 500 g 11    clonazePAM (KLONOPIN) 0.5 MG tablet Take 1 tablet (0.5 mg) by mouth 2 times daily as needed for anxiety (Patient not taking: Reported on 7/16/2024) 60 tablet 5    insulin pen needle (32G X 6 MM) 32G X 6 MM miscellaneous Use 4 pen needles daily or as directed. 120 each 11    meclizine (ANTIVERT) 25 MG tablet Take 1 tablet (25 mg) by mouth daily as needed for dizziness (or motion sickness) (Patient not taking: Reported on 7/16/2024) 30 tablet 1    triamcinolone (NASACORT) 55 MCG/ACT nasal aerosol Spray 2 sprays into both nostrils daily (Patient not taking: Reported on 7/16/2024) 16.9 mL 11     No current facility-administered medications for this visit.       No past surgical history on file.    Social History     Socioeconomic History    Marital status:      Spouse name: Not on file    Number of children: Not on file    Years of education: Not on file    Highest education level: Not on file   Occupational History    Not on file   Tobacco Use     Smoking status: Never     Passive exposure: Never    Smokeless tobacco: Never   Vaping Use    Vaping status: Never Used   Substance and Sexual Activity    Alcohol use: Not on file    Drug use: Not on file    Sexual activity: Not on file   Other Topics Concern    Not on file   Social History Narrative    Not on file     Social Determinants of Health     Financial Resource Strain: Low Risk  (6/13/2024)    Financial Resource Strain     Within the past 12 months, have you or your family members you live with been unable to get utilities (heat, electricity) when it was really needed?: No   Food Insecurity: Low Risk  (6/13/2024)    Food Insecurity     Within the past 12 months, did you worry that your food would run out before you got money to buy more?: No     Within the past 12 months, did the food you bought just not last and you didn t have money to get more?: No   Transportation Needs: Low Risk  (6/13/2024)    Transportation Needs     Within the past 12 months, has lack of transportation kept you from medical appointments, getting your medicines, non-medical meetings or appointments, work, or from getting things that you need?: No   Physical Activity: Unknown (6/13/2024)    Exercise Vital Sign     Days of Exercise per Week: 0 days     Minutes of Exercise per Session: Not on file   Stress: Stress Concern Present (6/13/2024)    [employer] [place] [place] [employer] - Occupational Stress Questionnaire     Feeling of Stress : Very much   Social Connections: Unknown (6/13/2024)    Social Connection and Isolation Panel [NHANES]     Frequency of Communication with Friends and Family: Not on file     Frequency of Social Gatherings with Friends and Family: Never     Attends Mu-ism Services: Not on file     Active Member of Clubs or Organizations: Not on file     Attends Club or Organization Meetings: Not on file     Marital Status: Not on file   Interpersonal Safety: Not on file   Housing Stability: Low Risk   (6/13/2024)    Housing Stability     Do you have housing? : Yes     Are you worried about losing your housing?: No       REVIEW OF SYSTEMS  General: negative for fever  Resp: negative for chest pain   CV: negative for chest pain  : negative for dysuria , incontinence, frequency  Musculoskeletal: as above  Neurologic: negative for ataxia, saddle anesthesia, fecal incontinence, one sided weakness,  paresthesias    Physical Exam:  Vitals: /62 (BP Location: Right arm, Patient Position: Sitting, Cuff Size: Adult Regular)   Pulse 86   Temp 98.9  F (37.2  C) (Tympanic)   Resp 16   Wt 75.8 kg (167 lb)   LMP 10/01/2013 (Approximate)   SpO2 98%   BMI 29.58 kg/m    BMI= Body mass index is 29.58 kg/m .  Constitutional: healthy, alert and no acute distress   CARDIO: RRR, no MRG  RESP: lungs CTA, NAD  NEURO: Patellar  and ankle reflexes intact and equal bilaterally  BACK: No CVA tenderness.  Right lumbar paraspinal muscle TTP.  Also tender L5-S1. Strength intact and equal bilateral lower extremities.  Right hip-no trochanteric tenderness.  Full range of motion.  Right knee with quarter sized abrasion.  No effusion.  Diffuse anterior knee tenderness.  Noes gross joint laxity noted.  GAIT: intact  Psychiatric: mentation appears normal and affect normal/bright        Tamara Sosa PA-C

## 2024-07-16 NOTE — TELEPHONE ENCOUNTER
Called [employer]. They will be faxing over some paperwork requesting the things they need so that patient can qualify for a power chair instead of scooter, as a scooter would not be appropriate for her apartment.    They can be reached by phone at [phone] or fax at [phone].

## 2024-07-17 RX ORDER — CLONAZEPAM 0.5 MG/1
0.5 TABLET ORAL 2 TIMES DAILY PRN
Qty: 60 TABLET | Refills: 5 | Status: SHIPPED | OUTPATIENT
Start: 2024-07-17

## 2024-07-19 NOTE — TELEPHONE ENCOUNTER
Relayed nurse recommendation for e-visit. Patient stated they were able to wait until their 7/23 visit with Dr. Sanchez

## 2024-07-19 NOTE — TELEPHONE ENCOUNTER
Relayed nurse recommendation for e-visit. Patient stated they can wait until 7/23 visit with Dr. Sanchez

## 2024-07-24 DIAGNOSIS — I10 ESSENTIAL HYPERTENSION: ICD-10-CM

## 2024-07-24 DIAGNOSIS — K21.9 GASTROESOPHAGEAL REFLUX DISEASE, UNSPECIFIED WHETHER ESOPHAGITIS PRESENT: ICD-10-CM

## 2024-07-24 RX ORDER — HYDROCHLOROTHIAZIDE 12.5 MG/1
12.5 CAPSULE ORAL EVERY MORNING
Qty: 90 CAPSULE | Refills: 1 | OUTPATIENT
Start: 2024-07-24

## 2024-07-24 NOTE — TELEPHONE ENCOUNTER
Medication Question or Refill        What medication are you calling about (include dose and sig)?: omeprazole (PRILOSEC) 20 MG DR capsule  and     hydrochlorothiazide (MICROZIDE) 12.5 MG capsule       Preferred Pharmacy:   [facility] - [place], [place] - [address]   [address]   [address]  [place] [address]  Phone: [phone] Fax: [phone]    [id number] - [place], [place] - [address]  [address]  [place] [address]  Phone: [phone] Fax: [phone]    [facility] [place] - [place] - [address] [facility] [[email or web address] [address]  [address] [facility] [address]  [place] [address]  Phone: [phone] Fax: [phone]      Controlled Substance Agreement on file:   CSA -- Patient Level:    CSA: None found at the patient level.       Who prescribed the medication?: Brown    Do you need a refill? Yes    When did you use the medication last? N/a    Patient offered an appointment? No    Do you have any questions or concerns?  No      Could we send this information to you in [facility] or would you prefer to receive a phone call?:   No preference   Okay to leave a detailed message?: No at Other phone number:  pharmacy is calling for refills

## 2024-08-06 ENCOUNTER — OFFICE VISIT (OUTPATIENT)
Dept: OPHTHALMOLOGY | Facility: CLINIC | Age: 59
End: 2024-08-06
Payer: COMMERCIAL

## 2024-08-06 DIAGNOSIS — Z98.890 HISTORY OF LASER PHOTOCOAGULATION OF RETINA: ICD-10-CM

## 2024-08-06 DIAGNOSIS — H43.812 POSTERIOR VITREOUS DETACHMENT, LEFT EYE: ICD-10-CM

## 2024-08-06 DIAGNOSIS — Z01.01 ENCOUNTER FOR EXAMINATION OF EYES AND VISION WITH ABNORMAL FINDINGS: ICD-10-CM

## 2024-08-06 DIAGNOSIS — H53.002 AMBLYOPIA OF LEFT EYE: ICD-10-CM

## 2024-08-06 DIAGNOSIS — H52.4 PRESBYOPIA: ICD-10-CM

## 2024-08-06 DIAGNOSIS — E11.8 TYPE 2 DIABETES MELLITUS WITH COMPLICATION, WITH LONG-TERM CURRENT USE OF INSULIN (H): Primary | ICD-10-CM

## 2024-08-06 DIAGNOSIS — H52.31 ANISOMETROPIA: ICD-10-CM

## 2024-08-06 DIAGNOSIS — Z79.4 TYPE 2 DIABETES MELLITUS WITH COMPLICATION, WITH LONG-TERM CURRENT USE OF INSULIN (H): Primary | ICD-10-CM

## 2024-08-06 DIAGNOSIS — H25.813 COMBINED FORMS OF AGE-RELATED CATARACT OF BOTH EYES: ICD-10-CM

## 2024-08-06 PROCEDURE — 92014 COMPRE OPH EXAM EST PT 1/>: CPT | Performed by: OPHTHALMOLOGY

## 2024-08-06 PROCEDURE — 92015 DETERMINE REFRACTIVE STATE: CPT | Performed by: OPHTHALMOLOGY

## 2024-08-06 ASSESSMENT — REFRACTION_MANIFEST
OD_AXIS: 160
OD_ADD: +2.50
OD_SPHERE: +0.50
OS_ADD: +2.50
OD_CYLINDER: +0.75
OS_SPHERE: -5.00
OS_AXIS: 088
OS_CYLINDER: +1.00

## 2024-08-06 ASSESSMENT — SLIT LAMP EXAM - LIDS
COMMENTS: 1+ DERMATOCHALASIS, 1+ SCLERAL SHOW
COMMENTS: 1+ DERMATOCHALASIS, 1+ SCLERAL SHOW

## 2024-08-06 ASSESSMENT — REFRACTION_WEARINGRX
OS_AXIS: 075
OS_ADD: +2.75
SPECS_TYPE: BIFOCAL-LINED
OD_SPHERE: -1.25
OD_CYLINDER: +0.75
OS_CYLINDER: +1.75
OD_AXIS: 146
OS_SPHERE: -3.50
OD_ADD: +2.75

## 2024-08-06 ASSESSMENT — CONF VISUAL FIELD
OD_INFERIOR_TEMPORAL_RESTRICTION: 0
OS_INFERIOR_NASAL_RESTRICTION: 3
OD_INFERIOR_NASAL_RESTRICTION: 0
OS_INFERIOR_TEMPORAL_RESTRICTION: 3
OD_SUPERIOR_NASAL_RESTRICTION: 0
OS_SUPERIOR_NASAL_RESTRICTION: 3
OD_SUPERIOR_TEMPORAL_RESTRICTION: 0
OD_NORMAL: 1

## 2024-08-06 ASSESSMENT — EXTERNAL EXAM - LEFT EYE: OS_EXAM: NORMAL

## 2024-08-06 ASSESSMENT — TONOMETRY
OD_IOP_MMHG: 16
IOP_METHOD: APPLANATION
OS_IOP_MMHG: 17

## 2024-08-06 ASSESSMENT — VISUAL ACUITY
CORRECTION_TYPE: GLASSES
OS_CC+: +1
OS_PH_CC: 20/60
OD_CC: 20/70
OD_CC+: -2
METHOD: SNELLEN - LINEAR
OD_PH_CC+: -1
OD_PH_CC: 20/30
OS_PH_CC+: -2
OS_CC: 20/100

## 2024-08-06 ASSESSMENT — EXTERNAL EXAM - RIGHT EYE: OD_EXAM: NORMAL

## 2024-08-06 ASSESSMENT — CUP TO DISC RATIO
OS_RATIO: 0.1
OD_RATIO: 0.3

## 2024-08-06 NOTE — PROGRESS NOTES
" Current Eye Medications:  artificial tears BID both eyes      Subjective:  here for complete eye exam today. Had glasses adjusted the other day and still is not seeing well through them. Right eye is getting more blurred.  As a child, had a lazy left eye, no patching. Seeing some floaters in both eyes now, not sure if they are new. Dry eyes and allergies. In the last week, has been having a low sugar once a day at least. Blood sugar was in the 40's yesterday.  Has a monitor on that goes to the phone, 100 sugar right now.  Asked her to eat something, had a granola bar. Has only had Coffee so far this am.    Lab Results   Component Value Date    A1C >15.0 06/13/2024    A1C 13.4 01/17/2024      Working with Endo and dietician.     Objective:  See Ophthalmology Exam.       Assessment:  Refractive shift both eyes in diabetic patient with recent blood sugars out of control.      ICD-10-CM    1. Type 2 diabetes mellitus with complication, with long-term current use of insulin (H)  E11.8     Z79.4       2. Combined forms of age-related cataract, mild-mod, of both eyes  H25.813       3. Amblyopia, mod, of left eye  H53.002       4. History of laser photocoagulation of retina, os  Z98.890       5. Anisometropia  H52.31       6. Posterior vitreous detachment, left eye  H43.812       7. Encounter for examination of eyes and vision with abnormal findings  Z01.01       8. Presbyopia  H52.4            Plan:  Don't recommend filling glasses Rx until your A1C improves     May use artificial tears up to four times a day (like Refresh Optive, Systane Balance, or TheraTears. Avoid \"get the red out\" drops and generic artifical tears).     Possible posterior vitreous detachment (sudden onset large floater and/or flashing lights) right eye discussed.     Return visit in 2 months for an MR (if A1C is improved)    Dr. Spicer [phone]          "

## 2024-08-06 NOTE — PATIENT INSTRUCTIONS
"Don't recommend filling glasses Rx until your A1C improves     May use artificial tears up to four times a day (like Refresh Optive, Systane Balance, or TheraTears. Avoid \"get the red out\" drops and generic artifical tears).     Possible posterior vitreous detachment (sudden onset large floater and/or flashing lights) right eye discussed.     Return visit in 2 months for an MR (if A1C is improved; (no chg).    Dr. Spicer [phone]      Patient Education   Diabetes weakens the blood vessels all over the body, including the eyes. Damage to the blood vessels in the eyes can cause swelling or bleeding into part of the eye (called the retina). This is called diabetic retinopathy (SARAH-tin--pu-thee). If not treated, this disease can cause vision loss or blindness.   Symptoms may include blurred or distorted vision, but many people have no symptoms. It's important to see your eye doctor regularly to check for problems.   Early treatment and good control can help protect your vision. Here are the things you can do to help prevent vision loss:      1. Keep your blood sugar levels under tight control.      2. Bring high blood pressure under control.      3. No smoking.      4. Have yearly dilated eye exams.     "

## 2024-08-06 NOTE — LETTER
"8/6/2024      Gillian Camara  [address]  [facility] [address]      Dear Colleague,    Thank you for referring your patient, Gillian Camara, to the [facility]. Please see a copy of my visit note below.     Current Eye Medications:  artificial tears BID both eyes      Subjective:  here for complete eye exam today. Had glasses adjusted the other day and still is not seeing well through them. Right eye is getting more blurred.  As a child, had a lazy left eye, no patching. Seeing some floaters in both eyes now, not sure if they are new. Dry eyes and allergies. In the last week, has been having a low sugar once a day at least. Blood sugar was in the 40's yesterday.  Has a monitor on that goes to the phone, 100 sugar right now.  Asked her to eat something, had a granola bar. Has only had Coffee so far this am.    Lab Results   Component Value Date    A1C >15.0 06/13/2024    A1C 13.4 01/17/2024         Objective:  See Ophthalmology Exam.       Assessment:  Refractive shift both eyes in diabetic patient with recent blood sugars out of control.      ICD-10-CM    1. Type 2 diabetes mellitus with complication, with long-term current use of insulin (H)  E11.8     Z79.4       2. Combined forms of age-related cataract, mild-mod, of both eyes  H25.813       3. Amblyopia, mod, of left eye  H53.002       4. History of laser photocoagulation of retina, os  Z98.890       5. Anisometropia  H52.31       6. Posterior vitreous detachment, left eye  H43.812       7. Encounter for examination of eyes and vision with abnormal findings  Z01.01       8. Presbyopia  H52.4            Plan:  Don't recommend filling glasses Rx until your A1C improves     May use artificial tears up to four times a day (like Refresh Optive, Systane Balance, or TheraTears. Avoid \"get the red out\" drops and generic artifical tears).     Possible posterior vitreous detachment (sudden onset large floater and/or flashing lights) " right eye discussed.     Return visit in 2 months for an MR (if A1C is improved)    Dr. Spicer [phone]              Again, thank you for allowing me to participate in the care of your patient.        Sincerely,        Niraj Spicer MD

## 2024-08-12 ENCOUNTER — TELEPHONE (OUTPATIENT)
Dept: FAMILY MEDICINE | Facility: CLINIC | Age: 59
End: 2024-08-12
Payer: COMMERCIAL

## 2024-08-16 ENCOUNTER — MEDICAL CORRESPONDENCE (OUTPATIENT)
Dept: HEALTH INFORMATION MANAGEMENT | Facility: CLINIC | Age: 59
End: 2024-08-16

## 2024-08-16 NOTE — TELEPHONE ENCOUNTER
Dr Sanchez    Please address the form for pt power wheelchair    Polly Stacy RN   [facility]

## 2024-09-03 ENCOUNTER — PATIENT OUTREACH (OUTPATIENT)
Dept: CARE COORDINATION | Facility: CLINIC | Age: 59
End: 2024-09-03
Payer: COMMERCIAL

## 2024-09-13 ENCOUNTER — TELEPHONE (OUTPATIENT)
Dept: FAMILY MEDICINE | Facility: CLINIC | Age: 59
End: 2024-09-13
Payer: COMMERCIAL

## 2024-09-13 ENCOUNTER — PATIENT OUTREACH (OUTPATIENT)
Dept: CARE COORDINATION | Facility: CLINIC | Age: 59
End: 2024-09-13
Payer: COMMERCIAL

## 2024-09-13 NOTE — TELEPHONE ENCOUNTER
Pt is try ing to get a mobility chair for her home.   Questionar has been sent by [employer]. Was unable to find.   TC called [employer] to see where the faxes had gone or at least come from.   We are having them fax us again and have PCP fill out and send back .     Nurse to call and follow up on progress on Monday.   Thanks,  Candelario Sanchez RN  [facility]

## 2024-09-13 NOTE — TELEPHONE ENCOUNTER
Spoke to [employer]. They said nothing is needed on our end. They said that they are waiting for a PA for the insurance company. Everything else has been received.

## 2024-09-22 ENCOUNTER — HEALTH MAINTENANCE LETTER (OUTPATIENT)
Age: 59
End: 2024-09-22

## 2024-10-08 DIAGNOSIS — Z79.4 TYPE 2 DIABETES MELLITUS WITH COMPLICATION, WITH LONG-TERM CURRENT USE OF INSULIN (H): ICD-10-CM

## 2024-10-08 DIAGNOSIS — E11.8 TYPE 2 DIABETES MELLITUS WITH COMPLICATION, WITH LONG-TERM CURRENT USE OF INSULIN (H): ICD-10-CM

## 2024-10-08 RX ORDER — GABAPENTIN 600 MG/1
600 TABLET ORAL 3 TIMES DAILY
Qty: 90 TABLET | Refills: 3 | Status: SHIPPED | OUTPATIENT
Start: 2024-10-08

## 2024-10-28 ENCOUNTER — MYC MEDICAL ADVICE (OUTPATIENT)
Dept: PEDIATRICS | Facility: CLINIC | Age: 59
End: 2024-10-28

## 2024-10-29 ENCOUNTER — TELEPHONE (OUTPATIENT)
Dept: OPHTHALMOLOGY | Facility: CLINIC | Age: 59
End: 2024-10-29
Payer: COMMERCIAL

## 2024-10-29 NOTE — TELEPHONE ENCOUNTER
I spoke to Gillian and scheduled an appointment for a repeat MR. She says she is having her A1C taken on 11-6-24, so I scheduled her eye appointment for 11-11-24 so Dr. Spicer has the new A1C. Pt says the 90 day reading from her Dexcom device says her A1C is 11.2.     Madhuri Quezada

## 2024-10-29 NOTE — TELEPHONE ENCOUNTER
[facility] Call Center    Phone Message    May a detailed message be left on voicemail: yes     Reason for Call: Other: Dr. Spicer's disposition: Return visit in 2 months for an MR (if A1C is improved; (no chg).  ??, please call patient to schedule.       Action Taken: Message routed to:  [facility] Ophthalmology    Travel Screening: Not Applicable     Date of Service:

## 2024-11-06 ENCOUNTER — ANCILLARY PROCEDURE (OUTPATIENT)
Dept: GENERAL RADIOLOGY | Facility: CLINIC | Age: 59
End: 2024-11-06
Attending: PHYSICIAN ASSISTANT
Payer: COMMERCIAL

## 2024-11-06 ENCOUNTER — VIRTUAL VISIT (OUTPATIENT)
Dept: FAMILY MEDICINE | Facility: CLINIC | Age: 59
End: 2024-11-06
Payer: COMMERCIAL

## 2024-11-06 ENCOUNTER — OFFICE VISIT (OUTPATIENT)
Dept: URGENT CARE | Facility: URGENT CARE | Age: 59
End: 2024-11-06
Payer: COMMERCIAL

## 2024-11-06 VITALS
HEART RATE: 114 BPM | TEMPERATURE: 98.7 F | SYSTOLIC BLOOD PRESSURE: 95 MMHG | WEIGHT: 160 LBS | OXYGEN SATURATION: 97 % | BODY MASS INDEX: 28.34 KG/M2 | DIASTOLIC BLOOD PRESSURE: 59 MMHG

## 2024-11-06 DIAGNOSIS — J18.9 PNEUMONIA OF BOTH LOWER LOBES DUE TO INFECTIOUS ORGANISM: Primary | ICD-10-CM

## 2024-11-06 DIAGNOSIS — Z79.4 TYPE 2 DIABETES MELLITUS WITH COMPLICATION, WITH LONG-TERM CURRENT USE OF INSULIN (H): ICD-10-CM

## 2024-11-06 DIAGNOSIS — E11.8 TYPE 2 DIABETES MELLITUS WITH COMPLICATION, WITH LONG-TERM CURRENT USE OF INSULIN (H): ICD-10-CM

## 2024-11-06 DIAGNOSIS — J45.31 MILD PERSISTENT ASTHMA WITH ACUTE EXACERBATION: ICD-10-CM

## 2024-11-06 DIAGNOSIS — J81.0 ACUTE PULMONARY EDEMA (H): ICD-10-CM

## 2024-11-06 DIAGNOSIS — R11.0 NAUSEA: Primary | ICD-10-CM

## 2024-11-06 PROCEDURE — 71046 X-RAY EXAM CHEST 2 VIEWS: CPT | Mod: TC | Performed by: RADIOLOGY

## 2024-11-06 PROCEDURE — 99442 PR PHYSICIAN TELEPHONE EVALUATION 11-20 MIN: CPT | Mod: 93 | Performed by: FAMILY MEDICINE

## 2024-11-06 PROCEDURE — 99205 OFFICE O/P NEW HI 60 MIN: CPT | Performed by: PHYSICIAN ASSISTANT

## 2024-11-06 RX ORDER — AZITHROMYCIN 250 MG/1
TABLET, FILM COATED ORAL
Qty: 6 TABLET | Refills: 0 | Status: SHIPPED | OUTPATIENT
Start: 2024-11-06 | End: 2024-11-11

## 2024-11-06 RX ORDER — ALBUTEROL SULFATE 0.83 MG/ML
2.5 SOLUTION RESPIRATORY (INHALATION) EVERY 6 HOURS PRN
Qty: 90 ML | Refills: 1 | Status: SHIPPED | OUTPATIENT
Start: 2024-11-06

## 2024-11-06 ASSESSMENT — ENCOUNTER SYMPTOMS
WEAKNESS: 0
LIGHT-HEADEDNESS: 0
DIZZINESS: 0
CARDIOVASCULAR NEGATIVE: 1
HEADACHES: 0
SHORTNESS OF BREATH: 0
SORE THROAT: 0
DIARRHEA: 0
ALLERGIC/IMMUNOLOGIC NEGATIVE: 1
FEVER: 0
COUGH: 1
ENDOCRINE NEGATIVE: 1
EYES NEGATIVE: 1
VOMITING: 0
NECK PAIN: 0
NECK STIFFNESS: 0
ARTHRALGIAS: 0
PALPITATIONS: 0
RHINORRHEA: 0
BACK PAIN: 0
MUSCULOSKELETAL NEGATIVE: 1
NAUSEA: 0
JOINT SWELLING: 0
WOUND: 0
MYALGIAS: 0
CHILLS: 0

## 2024-11-06 ASSESSMENT — ASTHMA QUESTIONNAIRES: ACT_TOTALSCORE: 11

## 2024-11-06 NOTE — PROGRESS NOTES
Chief Complaint:     Chief Complaint   Patient presents with    Urgent Care    Asthma     It so bad, been using nebulizer 2-3 times a day and making her vomit        Results for orders placed or performed in visit on 11/06/24   XR Chest 2 Views     Status: None (Preliminary result)    Narrative    XR CHEST 2 VIEWS   11/6/2024 12:42 PM     HISTORY: Mild persistent asthma with acute exacerbation.    COMPARISON: None.      Impression    IMPRESSION: Bilateral vascular congestion and potential mild pulmonary  edema. Normal cardiac silhouette. No focal airspace consolidation  identified.       Medical Decision Making:    Vital signs reviewed by Keenan Sawant PA-C  BP 95/59 (BP Location: Left arm, Patient Position: Sitting, Cuff Size: Adult Regular)   Pulse 114   Temp 98.7  F (37.1  C) (Tympanic)   Wt 72.6 kg (160 lb)   LMP 10/01/2013 (Approximate)   SpO2 97%   BMI 28.34 kg/m      Differential Diagnosis:  URI Adult/Peds:  Asthma exacerbation, Bronchitis-viral, Pneumonia, and Viral upper respiratory illness        ASSESSMENT    1. Pneumonia of both lower lobes due to infectious organism    2. Mild persistent asthma with acute exacerbation    3. Type 2 diabetes mellitus with complication, with long-term current use of insulin (H)        PLAN    Patient is in no acute distress.    Temp is 98.7 in clinic today, lung sounds were clear, and O2 sats at 97% on RA.    Asthma is currently not well controlled.  Asthma control test is up to date.  Rx for refill of Albuterol neb solution sent in.    CXR shows bilateral infiltrates per my read.  Rx for Augmentin and Zithromax sent in today.  Rest, Push fluids, vaporizer, elevation of head of bed.  Ibuprofen and or Tylenol for any fever or body aches.  Over the counter cough suppressant- PRN- as discussed.   Patient at higher risk for severe infection with DM.  Patient instructed to monitor blood sugars closely with illness.  Continue taking your Metformin and Insulin and follow  up with your primary provider for any DM medication changes if needed.  If symptoms worsen, recheck immediately otherwise follow up with your PCP in the next week.  Worrisome symptoms discussed with instructions to go to the ED.  Patient verbalized understanding and agreed with this plan.    48 minutes was spent by me on the date of the encounter doing chart review, history and exam, documentation and further activities per the note.      Labs:    Results for orders placed or performed in visit on 11/06/24   XR Chest 2 Views     Status: None (Preliminary result)    Narrative    XR CHEST 2 VIEWS   11/6/2024 12:42 PM     HISTORY: Mild persistent asthma with acute exacerbation.    COMPARISON: None.      Impression    IMPRESSION: Bilateral vascular congestion and potential mild pulmonary  edema. Normal cardiac silhouette. No focal airspace consolidation  identified.        Vital signs reviewed by Keenan Sawant PA-C  BP 95/59 (BP Location: Left arm, Patient Position: Sitting, Cuff Size: Adult Regular)   Pulse 114   Temp 98.7  F (37.1  C) (Tympanic)   Wt 72.6 kg (160 lb)   LMP 10/01/2013 (Approximate)   SpO2 97%   BMI 28.34 kg/m      Current Meds      Current Outpatient Medications:     albuterol (PROAIR HFA/PROVENTIL HFA/VENTOLIN HFA) 108 (90 Base) MCG/ACT inhaler, Inhale 1-2 puffs into the lungs every 4 hours as needed for wheezing, Disp: 18 g, Rfl: 11    ammonium lactate (LAC-HYDRIN) 12 % external lotion, Apply topically as needed for dry skin, Disp: 500 g, Rfl: 11    amoxicillin-clavulanate (AUGMENTIN) 875-125 MG tablet, Take 1 tablet by mouth 2 times daily for 10 days., Disp: 20 tablet, Rfl: 0    atorvastatin (LIPITOR) 80 MG tablet, Take 1 tablet (80 mg) by mouth daily, Disp: 90 tablet, Rfl: 3    azithromycin (ZITHROMAX) 250 MG tablet, Take 2 tablets (500 mg) by mouth daily for 1 day, THEN 1 tablet (250 mg) daily for 4 days., Disp: 6 tablet, Rfl: 0    clonazePAM (KLONOPIN) 0.5 MG tablet, Take 1 tablet (0.5  mg) by mouth 2 times daily as needed for anxiety, Disp: 60 tablet, Rfl: 5    clotrimazole (LOTRIMIN) 1 % external solution, Apply topically 2 times daily, Disp: 60 mL, Rfl: 11    ezetimibe (ZETIA) 10 MG tablet, Take 1 tablet (10 mg) by mouth daily, Disp: 90 tablet, Rfl: 3    famotidine (PEPCID) 20 MG tablet, Take 1 tablet (20 mg) by mouth 2 times daily, Disp: 180 tablet, Rfl: 3    fluticasone (ARNUITY ELLIPTA) 200 MCG/ACT inhaler, Inhale 1 puff into the lungs at bedtime, Disp: 30 each, Rfl: 11    fluticasone (FLONASE) 50 MCG/ACT nasal spray, Spray 1 spray in nostril daily as needed for allergies, Disp: 16 g, Rfl: 11    gabapentin (NEURONTIN) 600 MG tablet, Take 1 tablet (600 mg) by mouth 3 times daily., Disp: 90 tablet, Rfl: 3    hydrochlorothiazide (MICROZIDE) 12.5 MG capsule, Take 1 capsule (12.5 mg) by mouth every morning, Disp: 90 capsule, Rfl: 1    insulin aspart (NOVOLOG FLEXPEN) 100 UNIT/ML pen, Inject 90 Units Subcutaneous 3 times daily (before meals), Disp: , Rfl:     insulin glargine (LANTUS SOLOSTAR) 100 UNIT/ML pen, Inject 110 Units Subcutaneous 2 times daily, Disp: , Rfl:     insulin pen needle (32G X 6 MM) 32G X 6 MM miscellaneous, Use 4 pen needles daily or as directed., Disp: 120 each, Rfl: 11    liraglutide (VICTOZA PEN) 18 MG/3ML solution, Inject 0.6 mg Subcutaneous daily, Disp: , Rfl:     losartan (COZAAR) 50 MG tablet, Take 1 tablet (50 mg) by mouth daily, Disp: 90 tablet, Rfl: 3    metFORMIN (GLUCOPHAGE XR) 500 MG 24 hr tablet, Take 1 tablet (500 mg) by mouth 2 times daily (with meals), Disp: 180 tablet, Rfl: 3    omeprazole (PRILOSEC) 20 MG DR capsule, Take 1 capsule (20 mg) by mouth daily before breakfast, Disp: 90 capsule, Rfl: 1    pramipexole (MIRAPEX) 0.25 MG tablet, Take 2 tablets (0.5 mg) by mouth at bedtime, Disp: 180 tablet, Rfl: 3    sertraline (ZOLOFT) 100 MG tablet, Take 2 tablets (200 mg) by mouth daily, Disp: 180 tablet, Rfl: 3    zinc oxide (DESITIN) 20 % external ointment, Apply  topically 6 times daily, Disp: 500 g, Rfl: 5    naproxen (NAPROSYN) 250 MG tablet, Take 250 mg by mouth 2 times daily (with meals) (Patient not taking: Reported on 11/6/2024), Disp: , Rfl:       Respiratory History    occasional episodes of bronchitis, pneumonia, and asthma      SUBJECTIVE    HPI: Gillina Camara is an 59 year old female who presents with chest congestion and cough nonproductive, persistent.  Symptoms began 1  month ago and has gradually worsening.  There is no shortness of breath and chest pain.  Patient is eating and drinking well.  No fever, nausea, vomiting, or diarrhea.    Patient denies any recent travel or exposure to known COVID positive tested individual.      ROS:     Review of Systems   Constitutional:  Negative for chills and fever.   HENT:  Positive for congestion. Negative for ear pain, rhinorrhea and sore throat.    Eyes: Negative.    Respiratory:  Positive for cough. Negative for shortness of breath.    Cardiovascular: Negative.  Negative for chest pain and palpitations.   Gastrointestinal:  Negative for diarrhea, nausea and vomiting.   Endocrine: Negative.    Genitourinary: Negative.    Musculoskeletal: Negative.  Negative for arthralgias, back pain, joint swelling, myalgias, neck pain and neck stiffness.   Skin: Negative.  Negative for rash and wound.   Allergic/Immunologic: Negative.  Negative for immunocompromised state.   Neurological:  Negative for dizziness, weakness, light-headedness and headaches.         Family History   Family History   Problem Relation Age of Onset    Pancreatic Cancer Brother     Diabetes Sister     Cancer Sister     Retinitis pigmentosa Sister         Problem history  Patient Active Problem List   Diagnosis    Adult attention deficit hyperactivity disorder    Chronic neck pain    Essential hypertension    Fatty liver    Gastroesophageal reflux disease without esophagitis    Generalized anxiety disorder with panic attacks    Gastroparesis    Hyperlipidemia     Lower extremity edema    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 (H)    Mild persistent asthma without complication    Peripheral neuropathy    Severe episode of recurrent major depressive disorder, without psychotic features (H)    Type 2 diabetes mellitus with complication, with long-term current use of insulin (H)    Urge incontinence of urine    History of laser photocoagulation of retina, os    Anisometropia    Amblyopia, mod, of left eye    Combined forms of age-related cataract, mild-mod, of both eyes    Posterior vitreous detachment, left eye    Acute neck pain    Acute pain of left shoulder    Acute bilateral low back pain without sciatica        Allergies  Allergies   Allergen Reactions    Lisinopril Cough    Seasonal Allergies         Social History  Social History     Socioeconomic History    Marital status:      Spouse name: Not on file    Number of children: Not on file    Years of education: Not on file    Highest education level: Not on file   Occupational History    Not on file   Tobacco Use    Smoking status: Never     Passive exposure: Never    Smokeless tobacco: Never   Vaping Use    Vaping status: Never Used   Substance and Sexual Activity    Alcohol use: Not on file    Drug use: Not on file    Sexual activity: Not on file   Other Topics Concern    Not on file   Social History Narrative    Not on file     Social Drivers of Health     Financial Resource Strain: Low Risk  (6/13/2024)    Financial Resource Strain     Within the past 12 months, have you or your family members you live with been unable to get utilities (heat, electricity) when it was really needed?: No   Food Insecurity: Low Risk  (6/13/2024)    Food Insecurity     Within the past 12 months, did you worry that your food would run out before you got money to buy more?: No     Within the past 12 months, did the food you bought just not last and you didn t have money to get more?: No    Transportation Needs: Low Risk  (6/13/2024)    Transportation Needs     Within the past 12 months, has lack of transportation kept you from medical appointments, getting your medicines, non-medical meetings or appointments, work, or from getting things that you need?: No   Physical Activity: Unknown (6/13/2024)    Exercise Vital Sign     Days of Exercise per Week: 0 days     Minutes of Exercise per Session: Not on file   Stress: Stress Concern Present (6/13/2024)    [employer] - Occupational Stress Questionnaire     Feeling of Stress : Very much   Social Connections: Unknown (6/13/2024)    Social Connection and Isolation Panel [NHANES]     Frequency of Communication with Friends and Family: Not on file     Frequency of Social Gatherings with Friends and Family: Never     Attends Bahai Services: Not on file     Active Member of Clubs or Organizations: Not on file     Attends Club or Organization Meetings: Not on file     Marital Status: Not on file   Interpersonal Safety: Not on file   Housing Stability: Low Risk  (6/13/2024)    Housing Stability     Do you have housing? : Yes     Are you worried about losing your housing?: No        OBJECTIVE     Vital signs reviewed by Keenan Sawant PA-C  BP 95/59 (BP Location: Left arm, Patient Position: Sitting, Cuff Size: Adult Regular)   Pulse 114   Temp 98.7  F (37.1  C) (Tympanic)   Wt 72.6 kg (160 lb)   LMP 10/01/2013 (Approximate)   SpO2 97%   BMI 28.34 kg/m       Physical Exam  Vitals and nursing note reviewed.   Constitutional:       General: She is not in acute distress.     Appearance: She is well-developed. She is not ill-appearing, toxic-appearing or diaphoretic.   HENT:      Head: Normocephalic and atraumatic.      Right Ear: Hearing, tympanic membrane, ear canal and external ear normal. Tympanic membrane is not perforated, erythematous, retracted or bulging.      Left Ear: Hearing, tympanic membrane, ear canal and  external ear normal. Tympanic membrane is not perforated, erythematous, retracted or bulging.      Nose: Congestion present. No mucosal edema or rhinorrhea.      Right Sinus: No maxillary sinus tenderness or frontal sinus tenderness.      Left Sinus: No maxillary sinus tenderness or frontal sinus tenderness.      Mouth/Throat:      Pharynx: No pharyngeal swelling, oropharyngeal exudate, posterior oropharyngeal erythema or uvula swelling.      Tonsils: No tonsillar exudate or tonsillar abscesses. 0 on the right. 0 on the left.   Eyes:      General:         Right eye: No discharge.         Left eye: No discharge.      Pupils: Pupils are equal, round, and reactive to light.   Cardiovascular:      Rate and Rhythm: Normal rate and regular rhythm.      Heart sounds: Normal heart sounds. No murmur heard.     No friction rub. No gallop.   Pulmonary:      Effort: Pulmonary effort is normal. No respiratory distress.      Breath sounds: Normal breath sounds. No decreased breath sounds, wheezing, rhonchi or rales.   Chest:      Chest wall: No tenderness.   Abdominal:      General: Bowel sounds are normal. There is no distension.      Palpations: Abdomen is soft. There is no mass.      Tenderness: There is no abdominal tenderness. There is no guarding.   Musculoskeletal:      Cervical back: Normal range of motion and neck supple.   Lymphadenopathy:      Head:      Right side of head: No submental, submandibular, tonsillar, preauricular or posterior auricular adenopathy.      Left side of head: No submental, submandibular, tonsillar, preauricular or posterior auricular adenopathy.      Cervical: No cervical adenopathy.      Right cervical: No superficial or posterior cervical adenopathy.     Left cervical: No superficial or posterior cervical adenopathy.   Skin:     General: Skin is warm and dry.      Findings: No rash.   Neurological:      Mental Status: She is alert and oriented to person, place, and time.      Cranial Nerves:  No cranial nerve deficit.      Deep Tendon Reflexes: Reflexes are normal and symmetric.   Psychiatric:         Behavior: Behavior normal. Behavior is cooperative.         Thought Content: Thought content normal.         Judgment: Judgment normal.           Keenan Sawant PA-C  11/6/2024, 12:10 PM

## 2024-11-06 NOTE — PROGRESS NOTES
Gillian is a 59 year old who is being evaluated via a billable telephone visit.      Originating Location (pt. Location): Home    Distant Location (provider location):  On-site    Assessment & Plan     Nausea  Acute pulmonary edema (H)  Spoke with patient over the phone. High risk for ACS   Was at urgent care a couple hours ago for cough.  Was diagnosed with pneumonia.  Formal read shows pulmonary edema and vascular congestion, no pneumonia.  Cough, orthopnea, postural dyspnea when laying down.  With nausea and risk factors, concern for ACS and or Acute Heart Failure. Very easily winded.  Needs urgent evaluation.  Wonders if she can get to the car safely to go to ER. EMS activated by me. Needs Eval for cardiac cause of symptoms.        Subjective   Gillian is a 59 year old, presenting for the following health issues:  No chief complaint on file.    HPI     Has trouble breathing     DM2   Dexcom wearing   A1c likely 11.4 based on the average   Seeing endocrinology sometime in November   Giving insulin twice a day for long acting   Giving fast acting with meals   Doesn't eat often     Was on ozempic, but made gastroperesis worse     Using an inhaler     Legs have been swollen about a week ago   Has been throwing up.   Went to urgent care today   Treated with abx  Pulmonary edema on official read   Could barely stand to get XRAY            Objective           Vitals:  No vitals were obtained today due to virtual visit.    Physical Exam  Constitutional:       General: She is not in acute distress.  Eyes:      General: No scleral icterus.  Pulmonary:      Effort: No respiratory distress.   Neurological:      Mental Status: She is alert.   Psychiatric:         Mood and Affect: Mood normal.         Behavior: Behavior normal.        General: Alert and no distress //Respiratory: No audible wheeze, cough, or shortness of breath // Psychiatric:  Appropriate affect, tone, and pace of words        Phone call duration: 15  minutes  Signed Electronically by: Jose Sanchez DO

## 2024-11-07 ENCOUNTER — TELEPHONE (OUTPATIENT)
Dept: FAMILY MEDICINE | Facility: CLINIC | Age: 59
End: 2024-11-07
Payer: COMMERCIAL

## 2024-11-07 NOTE — TELEPHONE ENCOUNTER
Called pt and emerg contact.  No answer.  Sent EMS to house yesterday.  Sent a text via [employer] to husbands phone number.  Cannot see [facility] records and that is closest ER with cardiac capabilities so I assume they went there.      Jose Sanchez, DO

## 2024-11-11 ENCOUNTER — TELEPHONE (OUTPATIENT)
Dept: OPHTHALMOLOGY | Facility: CLINIC | Age: 59
End: 2024-11-11

## 2024-11-11 NOTE — TELEPHONE ENCOUNTER
[facility] Call Center    Phone Message    May a detailed message be left on voicemail: yes     Reason for Call: Other: Patient had to cancel her appt that was scheduled for today due to just being discharged from the hospital. She was scheduled for a MR only appt, writer unsure if something I can reschedule or clinic only. Please advise.      Action Taken: Other: BILL eye    Travel Screening: Not Applicable

## 2024-11-11 NOTE — TELEPHONE ENCOUNTER
LVM with patient to call [facility] line to schedule. Can be made in next available SERENE spot. Thanks [facility] team.

## 2024-11-19 ENCOUNTER — TELEPHONE (OUTPATIENT)
Dept: FAMILY MEDICINE | Facility: CLINIC | Age: 59
End: 2024-11-19
Payer: COMMERCIAL

## 2024-11-19 NOTE — TELEPHONE ENCOUNTER
Called patient.     Seems to be doing well   Swelling legs right now   Has HF and cirrhosis   Fortunately has in person appointment with GI for cirrhosis tomorrow   Encouraged her to ask about the swelling in legs tomorrow     Jose Sanchez, DO

## 2024-11-20 ENCOUNTER — TRANSFERRED RECORDS (OUTPATIENT)
Dept: HEALTH INFORMATION MANAGEMENT | Facility: CLINIC | Age: 59
End: 2024-11-20

## 2024-11-26 ENCOUNTER — OFFICE VISIT (OUTPATIENT)
Dept: FAMILY MEDICINE | Facility: CLINIC | Age: 59
End: 2024-11-26
Payer: COMMERCIAL

## 2024-11-26 VITALS
WEIGHT: 177 LBS | SYSTOLIC BLOOD PRESSURE: 112 MMHG | BODY MASS INDEX: 31.35 KG/M2 | RESPIRATION RATE: 20 BRPM | OXYGEN SATURATION: 99 % | DIASTOLIC BLOOD PRESSURE: 58 MMHG | HEART RATE: 74 BPM

## 2024-11-26 DIAGNOSIS — Z12.31 VISIT FOR SCREENING MAMMOGRAM: ICD-10-CM

## 2024-11-26 DIAGNOSIS — D50.9 IRON DEFICIENCY ANEMIA, UNSPECIFIED IRON DEFICIENCY ANEMIA TYPE: ICD-10-CM

## 2024-11-26 DIAGNOSIS — D64.9 ANEMIA, UNSPECIFIED TYPE: ICD-10-CM

## 2024-11-26 DIAGNOSIS — Z79.4 TYPE 2 DIABETES MELLITUS WITH COMPLICATION, WITH LONG-TERM CURRENT USE OF INSULIN (H): Primary | ICD-10-CM

## 2024-11-26 DIAGNOSIS — I50.23 ACUTE ON CHRONIC SYSTOLIC HEART FAILURE (H): ICD-10-CM

## 2024-11-26 DIAGNOSIS — E11.8 TYPE 2 DIABETES MELLITUS WITH COMPLICATION, WITH LONG-TERM CURRENT USE OF INSULIN (H): Primary | ICD-10-CM

## 2024-11-26 DIAGNOSIS — D61.818 PANCYTOPENIA (H): ICD-10-CM

## 2024-11-26 DIAGNOSIS — K74.60 CIRRHOSIS OF LIVER WITHOUT ASCITES, UNSPECIFIED HEPATIC CIRRHOSIS TYPE (H): ICD-10-CM

## 2024-11-26 LAB
AMMONIA PLAS-SCNC: 42 UMOL/L (ref 11–51)
BASOPHILS # BLD AUTO: 0 10E3/UL (ref 0–0.2)
BASOPHILS NFR BLD AUTO: 1 %
EOSINOPHIL # BLD AUTO: 0.1 10E3/UL (ref 0–0.7)
EOSINOPHIL NFR BLD AUTO: 2 %
ERYTHROCYTE [DISTWIDTH] IN BLOOD BY AUTOMATED COUNT: 17.9 % (ref 10–15)
EST. AVERAGE GLUCOSE BLD GHB EST-MCNC: 266 MG/DL
FOLATE SERPL-MCNC: 10 NG/ML (ref 4.6–34.8)
HBA1C MFR BLD: 10.9 % (ref 0–5.6)
HCT VFR BLD AUTO: 28.3 % (ref 35–47)
HGB BLD-MCNC: 8 G/DL (ref 11.7–15.7)
IMM GRANULOCYTES # BLD: 0 10E3/UL
IMM GRANULOCYTES NFR BLD: 0 %
INR PPP: 1.19 (ref 0.85–1.15)
LYMPHOCYTES # BLD AUTO: 0.9 10E3/UL (ref 0.8–5.3)
LYMPHOCYTES NFR BLD AUTO: 23 %
MCH RBC QN AUTO: 22 PG (ref 26.5–33)
MCHC RBC AUTO-ENTMCNC: 28.3 G/DL (ref 31.5–36.5)
MCV RBC AUTO: 78 FL (ref 78–100)
MONOCYTES # BLD AUTO: 0.3 10E3/UL (ref 0–1.3)
MONOCYTES NFR BLD AUTO: 7 %
NEUTROPHILS # BLD AUTO: 2.6 10E3/UL (ref 1.6–8.3)
NEUTROPHILS NFR BLD AUTO: 67 %
PLATELET # BLD AUTO: 120 10E3/UL (ref 150–450)
RBC # BLD AUTO: 3.63 10E6/UL (ref 3.8–5.2)
WBC # BLD AUTO: 3.9 10E3/UL (ref 4–11)

## 2024-11-26 PROCEDURE — 82728 ASSAY OF FERRITIN: CPT | Performed by: FAMILY MEDICINE

## 2024-11-26 PROCEDURE — 80053 COMPREHEN METABOLIC PANEL: CPT | Performed by: FAMILY MEDICINE

## 2024-11-26 PROCEDURE — 82140 ASSAY OF AMMONIA: CPT | Performed by: FAMILY MEDICINE

## 2024-11-26 PROCEDURE — 85610 PROTHROMBIN TIME: CPT | Performed by: FAMILY MEDICINE

## 2024-11-26 PROCEDURE — 36415 COLL VENOUS BLD VENIPUNCTURE: CPT | Performed by: FAMILY MEDICINE

## 2024-11-26 PROCEDURE — 83880 ASSAY OF NATRIURETIC PEPTIDE: CPT | Performed by: FAMILY MEDICINE

## 2024-11-26 PROCEDURE — 83540 ASSAY OF IRON: CPT | Performed by: FAMILY MEDICINE

## 2024-11-26 PROCEDURE — 99214 OFFICE O/P EST MOD 30 MIN: CPT | Performed by: FAMILY MEDICINE

## 2024-11-26 PROCEDURE — 82043 UR ALBUMIN QUANTITATIVE: CPT | Performed by: FAMILY MEDICINE

## 2024-11-26 PROCEDURE — 83036 HEMOGLOBIN GLYCOSYLATED A1C: CPT | Performed by: FAMILY MEDICINE

## 2024-11-26 PROCEDURE — 83550 IRON BINDING TEST: CPT | Performed by: FAMILY MEDICINE

## 2024-11-26 PROCEDURE — 82746 ASSAY OF FOLIC ACID SERUM: CPT | Performed by: FAMILY MEDICINE

## 2024-11-26 PROCEDURE — 82570 ASSAY OF URINE CREATININE: CPT | Performed by: FAMILY MEDICINE

## 2024-11-26 PROCEDURE — 85025 COMPLETE CBC W/AUTO DIFF WBC: CPT | Performed by: FAMILY MEDICINE

## 2024-11-26 PROCEDURE — 85045 AUTOMATED RETICULOCYTE COUNT: CPT | Performed by: FAMILY MEDICINE

## 2024-11-26 PROCEDURE — 82607 VITAMIN B-12: CPT | Performed by: FAMILY MEDICINE

## 2024-11-26 RX ORDER — PANTOPRAZOLE SODIUM 40 MG/1
40 TABLET, DELAYED RELEASE ORAL DAILY
COMMUNITY
Start: 2024-11-10 | End: 2024-12-10

## 2024-11-26 RX ORDER — METOPROLOL SUCCINATE 25 MG/1
12.5 TABLET, EXTENDED RELEASE ORAL DAILY
COMMUNITY
Start: 2024-11-11 | End: 2024-12-11

## 2024-11-26 RX ORDER — LACTULOSE 10 G/15ML
20 SOLUTION ORAL 2 TIMES DAILY
COMMUNITY
Start: 2024-11-10 | End: 2024-12-10

## 2024-11-26 RX ORDER — FUROSEMIDE 20 MG/1
20 TABLET ORAL DAILY
Qty: 30 TABLET | Refills: 4 | Status: SHIPPED | OUTPATIENT
Start: 2024-11-26

## 2024-11-26 RX ORDER — FERROUS SULFATE 325(65) MG
325 TABLET ORAL
COMMUNITY
Start: 2024-11-11

## 2024-11-26 RX ORDER — LORATADINE 10 MG/1
10 TABLET ORAL DAILY
COMMUNITY

## 2024-11-26 NOTE — PROGRESS NOTES
Assessment & Plan     Type 2 diabetes mellitus with complication, with long-term current use of insulin (H)  Will check labs today   Working with outside endo   - Albumin Random Urine Quantitative with Creat Ratio; Future  - HEMOGLOBIN A1C; Future  - Albumin Random Urine Quantitative with Creat Ratio  - HEMOGLOBIN A1C    Visit for screening mammogram    Acute on chronic systolic heart failure (H)  Will take daily  Up 15 lbs   Will check bnp and labs today   - REVIEW OF HEALTH MAINTENANCE PROTOCOL ORDERS  - furosemide (LASIX) 20 MG tablet; Take 1 tablet (20 mg) by mouth daily.  - BNP-N terminal pro; Future  - BNP-N terminal pro    Anemia, unspecified type  Will check   Pale appearing in phase   Likely quite anemic.  2 weeks ago was 8.5   - CBC with platelets and differential; Future  - Vitamin B12; Future  - Folate; Future  - Iron and iron binding capacity; Future  - CBC with platelets and differential  - Vitamin B12  - Folate  - Iron and iron binding capacity    Cirrhosis of liver without ascites, unspecified hepatic cirrhosis type (H)  Taking lactulose   Will check labs today.    - Comprehensive metabolic panel (BMP + Alb, Alk Phos, ALT, AST, Total. Bili, TP); Future  - Ammonia; Future  - INR; Future  - Comprehensive metabolic panel (BMP + Alb, Alk Phos, ALT, AST, Total. Bili, TP)  - Ammonia  - INR        MED REC REQUIRED  Post Medication Reconciliation Status: discharge medications reconciled and changed, per note/orders      Ky French is a 59 year old, presenting for the following health issues:    Hospital F/U (11/6-10 [facility]  low hemoglobin (6.8)- GI bleed and NSTEMI )      11/26/2024    11:52 AM   Additional Questions   Roomed by Makenzie   Accompanied by      HPI     Hospital Follow-up Visit:    Hospital/Nursing Home/IP Rehab Facility:  [facility]  Date of Admission: 11/6  Date of Discharge: 11/10  Reason(s) for Admission: Javi hemoglobin-GI bleed and NSTEMI  Was the patient in the  ICU or did the patient experience delirium during hospitalization?  No  Do you have any other stressors you would like to discuss with your provider? Health Concerns-weight gain    Problems taking medications regularly:  None  Medication changes since discharge: pulled in new meds  Problems adhering to non-medication therapy:  None    Summary of hospitalization:  CareEverywhere information obtained and reviewed  Diagnostic Tests/Treatments reviewed.  Follow up needed: none  Other Healthcare Providers Involved in Patient s Care:         None  Update since discharge: improved.         Plan of care communicated with patient               Objective    /58 (BP Location: Left arm, Patient Position: Sitting, Cuff Size: Adult Regular)   Pulse 74   Resp 20   LMP 10/01/2013 (Approximate)   SpO2 99%   There is no height or weight on file to calculate BMI.  Physical Exam  Constitutional:       General: She is not in acute distress.  Eyes:      General: No scleral icterus.  Cardiovascular:      Rate and Rhythm: Normal rate and regular rhythm.      Heart sounds: Normal heart sounds.   Pulmonary:      Effort: No respiratory distress.      Comments: Mild crackles bilateral bases   Musculoskeletal:      Right lower leg: Edema (trace) present.      Left lower leg: Edema (trace) present.   Neurological:      Mental Status: She is alert.   Psychiatric:         Mood and Affect: Mood normal.         Behavior: Behavior normal.              Signed Electronically by: Jose Sanchez DO    .undefined[^^

## 2024-11-27 ENCOUNTER — PATIENT OUTREACH (OUTPATIENT)
Dept: CARE COORDINATION | Facility: CLINIC | Age: 59
End: 2024-11-27
Payer: COMMERCIAL

## 2024-11-27 ENCOUNTER — E-CONSULT (OUTPATIENT)
Dept: GASTROENTEROLOGY | Facility: CLINIC | Age: 59
End: 2024-11-27

## 2024-11-27 DIAGNOSIS — K74.60 HEPATIC CIRRHOSIS, UNSPECIFIED HEPATIC CIRRHOSIS TYPE, UNSPECIFIED WHETHER ASCITES PRESENT (H): Primary | ICD-10-CM

## 2024-11-27 DIAGNOSIS — K76.82 HEPATIC ENCEPHALOPATHY (H): Primary | ICD-10-CM

## 2024-11-27 DIAGNOSIS — D61.818 PANCYTOPENIA (H): ICD-10-CM

## 2024-11-27 DIAGNOSIS — D50.0 IRON DEFICIENCY ANEMIA DUE TO CHRONIC BLOOD LOSS: ICD-10-CM

## 2024-11-27 DIAGNOSIS — D50.9 IRON DEFICIENCY ANEMIA, UNSPECIFIED IRON DEFICIENCY ANEMIA TYPE: Primary | ICD-10-CM

## 2024-11-27 LAB
ALBUMIN SERPL BCG-MCNC: 3.8 G/DL (ref 3.5–5.2)
ALP SERPL-CCNC: 177 U/L (ref 40–150)
ALT SERPL W P-5'-P-CCNC: 39 U/L (ref 0–50)
ANION GAP SERPL CALCULATED.3IONS-SCNC: 10 MMOL/L (ref 7–15)
AST SERPL W P-5'-P-CCNC: 43 U/L (ref 0–45)
BILIRUB SERPL-MCNC: 0.4 MG/DL
BUN SERPL-MCNC: 13.1 MG/DL (ref 6–20)
CALCIUM SERPL-MCNC: 9.2 MG/DL (ref 8.8–10.4)
CHLORIDE SERPL-SCNC: 105 MMOL/L (ref 98–107)
CREAT SERPL-MCNC: 0.43 MG/DL (ref 0.51–0.95)
CREAT UR-MCNC: 54 MG/DL
EGFRCR SERPLBLD CKD-EPI 2021: >90 ML/MIN/1.73M2
FERRITIN SERPL-MCNC: 23 NG/ML (ref 11–328)
GLUCOSE SERPL-MCNC: 296 MG/DL (ref 70–99)
HCO3 SERPL-SCNC: 23 MMOL/L (ref 22–29)
IRON BINDING CAPACITY (ROCHE): 402 UG/DL (ref 240–430)
IRON SATN MFR SERPL: 5 % (ref 15–46)
IRON SERPL-MCNC: 21 UG/DL (ref 37–145)
MICROALBUMIN UR-MCNC: 22.8 MG/L
MICROALBUMIN/CREAT UR: 42.22 MG/G CR (ref 0–25)
NT-PROBNP SERPL-MCNC: 997 PG/ML (ref 0–900)
POTASSIUM SERPL-SCNC: 3.9 MMOL/L (ref 3.4–5.3)
PROT SERPL-MCNC: 6.5 G/DL (ref 6.4–8.3)
RETICS # AUTO: 0.07 10E6/UL (ref 0.03–0.1)
RETICS/RBC NFR AUTO: 1.9 % (ref 0.5–2)
SODIUM SERPL-SCNC: 138 MMOL/L (ref 135–145)
VIT B12 SERPL-MCNC: 782 PG/ML (ref 232–1245)

## 2024-11-27 PROCEDURE — 99207 E-CONSULT TO GASTROENTEROLOGY (ADULT OUTPT PROVIDER TO SPECIALIST WRITTEN QUESTION & RESPONSE): CPT | Performed by: FAMILY MEDICINE

## 2024-11-27 RX ORDER — ALBUTEROL SULFATE 90 UG/1
1-2 INHALANT RESPIRATORY (INHALATION)
Start: 2024-11-27

## 2024-11-27 RX ORDER — EPINEPHRINE 1 MG/ML
0.3 INJECTION, SOLUTION, CONCENTRATE INTRAVENOUS EVERY 5 MIN PRN
OUTPATIENT
Start: 2024-11-27

## 2024-11-27 RX ORDER — ALBUTEROL SULFATE 0.83 MG/ML
2.5 SOLUTION RESPIRATORY (INHALATION)
OUTPATIENT
Start: 2024-11-27

## 2024-11-27 RX ORDER — HEPARIN SODIUM (PORCINE) LOCK FLUSH IV SOLN 100 UNIT/ML 100 UNIT/ML
5 SOLUTION INTRAVENOUS
OUTPATIENT
Start: 2024-11-27

## 2024-11-27 RX ORDER — DIPHENHYDRAMINE HYDROCHLORIDE 50 MG/ML
50 INJECTION INTRAMUSCULAR; INTRAVENOUS
Start: 2024-11-27

## 2024-11-27 RX ORDER — MEPERIDINE HYDROCHLORIDE 25 MG/ML
25 INJECTION INTRAMUSCULAR; INTRAVENOUS; SUBCUTANEOUS
OUTPATIENT
Start: 2024-11-27

## 2024-11-27 RX ORDER — DIPHENHYDRAMINE HYDROCHLORIDE 50 MG/ML
25 INJECTION INTRAMUSCULAR; INTRAVENOUS
Start: 2024-11-27

## 2024-11-27 RX ORDER — METHYLPREDNISOLONE SODIUM SUCCINATE 40 MG/ML
40 INJECTION INTRAMUSCULAR; INTRAVENOUS
Start: 2024-11-27

## 2024-11-27 RX ORDER — HEPARIN SODIUM,PORCINE 10 UNIT/ML
5-20 VIAL (ML) INTRAVENOUS DAILY PRN
OUTPATIENT
Start: 2024-11-27

## 2024-11-27 NOTE — PROGRESS NOTES
11/27/2024     E-Consult has been accepted.    Interprofessional consultation requested by:  Jose Sanchez DO      Clinical Question/Purpose: MY CLINICAL QUESTION IS: patient with newly found cirrhosis at [facility] while having profound anemia, GI bleed, NSTEMI, and new heart failure. I do not see elevated ammonia levels when there.  She was discharged on lactulose daily from the hospital.  Rechecked labs including ammonia yesterday and only mildly elevated alk phos found. Does she need to continue lactulose?  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  Has appointment with GI in February at [facility].      Patient assessment and information reviewed:   Medical, surgical, family history, discharge summary, medications.    Hepatic encephalopathy is a clinical diagnosis.  Serum ammonia is not helpful in diagnosing or monitoring hepatic encephalopathy in patients with cirrhosis.  (Conversely, it is very helpful in diagnosing and managing patients with hepatic encephalopathy secondary to acute liver failure).      Patient may have developed hepatic encephalopathy while in hospital due to physiologic stress on liver from anemia, NSTEMI and heart failure.  It would be reasonable to discontinue lactulose if patient no longer has symptoms or physical exam findings of HE at this time.      Recommendations:   Consider discontinuation of lactulose if patient no longer has HE signs/symptoms  Follow-up with [facility] as scheduled    The recommendations provided in this E-Consult are based on a review of clinical data pertinent to the clinical question presented, without a review of the patient's complete medical record or, the benefit of a comprehensive in-person or virtual patient evaluation. This consultation should not replace the clinical judgement and evaluation of the provider ordering this E-Consult. Any  new clinical issues, or changes in patient status since the filing of this E-Consult will need to be taken into account when assessing these recommendations. Please contact me if you have further questions.    My total time spent reviewing clinical information and formulating assessment was 10 minutes.    Report sent automatically to requesting provider once signed.  Dioni Mukherjee MD

## 2024-11-27 NOTE — TELEPHONE ENCOUNTER
Pt called back and was relayed results message. No questions at this time. Thanks    Ryanne Mckeon RN  [facility]

## 2024-12-01 ENCOUNTER — HEALTH MAINTENANCE LETTER (OUTPATIENT)
Age: 59
End: 2024-12-01

## 2024-12-02 DIAGNOSIS — E11.8 TYPE 2 DIABETES MELLITUS WITH COMPLICATION, WITH LONG-TERM CURRENT USE OF INSULIN (H): ICD-10-CM

## 2024-12-02 DIAGNOSIS — Z79.4 TYPE 2 DIABETES MELLITUS WITH COMPLICATION, WITH LONG-TERM CURRENT USE OF INSULIN (H): ICD-10-CM

## 2024-12-03 ENCOUNTER — TELEPHONE (OUTPATIENT)
Dept: FAMILY MEDICINE | Facility: CLINIC | Age: 59
End: 2024-12-03
Payer: COMMERCIAL

## 2024-12-03 NOTE — TELEPHONE ENCOUNTER
[facility] to assess cognitive function (see below)    Thanks   Ciarra LION RN  [facility]

## 2024-12-03 NOTE — TELEPHONE ENCOUNTER
Pt called back and stated that she is feeling much better. Pt sounded up beat on the phone. Appreciative that she was called. Pt will stop the lactulose. She was encouraged to call back with any questions or concerns. Thanks      Ryanne Mckeon RN  [facility]

## 2024-12-03 NOTE — TELEPHONE ENCOUNTER
Please call patient.  See how her mental clarity is doing.  If she feels like she is doing well and you don't notice anything speaking with her, she can stop the lactulose. If she or navdeep find she is acting a little more tired than usual or just a bit out of it, then she should be seen same day or urgent care to do blood work and consider starting lactulose again.      Jose Sanchez, DO

## 2024-12-12 PROBLEM — I50.22 CHRONIC SYSTOLIC CONGESTIVE HEART FAILURE (H): Status: ACTIVE | Noted: 2024-12-12

## 2025-01-15 ENCOUNTER — OFFICE VISIT (OUTPATIENT)
Dept: FAMILY MEDICINE | Facility: CLINIC | Age: 60
End: 2025-01-15
Payer: COMMERCIAL

## 2025-01-15 VITALS
OXYGEN SATURATION: 97 % | SYSTOLIC BLOOD PRESSURE: 131 MMHG | HEART RATE: 80 BPM | TEMPERATURE: 97.5 F | RESPIRATION RATE: 17 BRPM | DIASTOLIC BLOOD PRESSURE: 66 MMHG

## 2025-01-15 DIAGNOSIS — E11.8 TYPE 2 DIABETES MELLITUS WITH COMPLICATION, WITH LONG-TERM CURRENT USE OF INSULIN (H): Primary | ICD-10-CM

## 2025-01-15 DIAGNOSIS — D50.8 OTHER IRON DEFICIENCY ANEMIA: ICD-10-CM

## 2025-01-15 DIAGNOSIS — R06.02 SHORTNESS OF BREATH: ICD-10-CM

## 2025-01-15 DIAGNOSIS — D50.9 IRON DEFICIENCY ANEMIA, UNSPECIFIED IRON DEFICIENCY ANEMIA TYPE: ICD-10-CM

## 2025-01-15 DIAGNOSIS — Z79.4 TYPE 2 DIABETES MELLITUS WITH COMPLICATION, WITH LONG-TERM CURRENT USE OF INSULIN (H): Primary | ICD-10-CM

## 2025-01-15 LAB
ALBUMIN SERPL BCG-MCNC: 4.2 G/DL (ref 3.5–5.2)
ALP SERPL-CCNC: 176 U/L (ref 40–150)
ALT SERPL W P-5'-P-CCNC: 29 U/L (ref 0–50)
ANION GAP SERPL CALCULATED.3IONS-SCNC: 10 MMOL/L (ref 7–15)
AST SERPL W P-5'-P-CCNC: 36 U/L (ref 0–45)
BILIRUB SERPL-MCNC: 0.3 MG/DL
BUN SERPL-MCNC: 10.4 MG/DL (ref 6–20)
CALCIUM SERPL-MCNC: 9.8 MG/DL (ref 8.8–10.4)
CHLORIDE SERPL-SCNC: 103 MMOL/L (ref 98–107)
CHOLEST SERPL-MCNC: 140 MG/DL
CREAT SERPL-MCNC: 0.5 MG/DL (ref 0.51–0.95)
EGFRCR SERPLBLD CKD-EPI 2021: >90 ML/MIN/1.73M2
FASTING STATUS PATIENT QL REPORTED: ABNORMAL
FASTING STATUS PATIENT QL REPORTED: NORMAL
GLUCOSE SERPL-MCNC: 143 MG/DL (ref 70–99)
HCO3 SERPL-SCNC: 28 MMOL/L (ref 22–29)
HDLC SERPL-MCNC: 69 MG/DL
IRON BINDING CAPACITY (ROCHE): 474 UG/DL (ref 240–430)
IRON SATN MFR SERPL: 86 % (ref 15–46)
IRON SERPL-MCNC: 406 UG/DL (ref 37–145)
LDLC SERPL CALC-MCNC: 55 MG/DL
NONHDLC SERPL-MCNC: 71 MG/DL
NT-PROBNP SERPL-MCNC: 166 PG/ML (ref 0–900)
POTASSIUM SERPL-SCNC: 3.8 MMOL/L (ref 3.4–5.3)
PROT SERPL-MCNC: 7 G/DL (ref 6.4–8.3)
RETICS # AUTO: 0.08 10E6/UL (ref 0.03–0.1)
RETICS/RBC NFR AUTO: 2 % (ref 0.5–2)
SODIUM SERPL-SCNC: 141 MMOL/L (ref 135–145)
TRIGL SERPL-MCNC: 82 MG/DL
TSH SERPL DL<=0.005 MIU/L-ACNC: 2.43 UIU/ML (ref 0.3–4.2)

## 2025-01-15 PROCEDURE — 99214 OFFICE O/P EST MOD 30 MIN: CPT | Performed by: FAMILY MEDICINE

## 2025-01-15 PROCEDURE — 82607 VITAMIN B-12: CPT | Performed by: FAMILY MEDICINE

## 2025-01-15 PROCEDURE — 80061 LIPID PANEL: CPT | Performed by: FAMILY MEDICINE

## 2025-01-15 PROCEDURE — 36415 COLL VENOUS BLD VENIPUNCTURE: CPT | Performed by: FAMILY MEDICINE

## 2025-01-15 PROCEDURE — 80053 COMPREHEN METABOLIC PANEL: CPT | Performed by: FAMILY MEDICINE

## 2025-01-15 PROCEDURE — 83550 IRON BINDING TEST: CPT | Mod: 59 | Performed by: FAMILY MEDICINE

## 2025-01-15 PROCEDURE — 84443 ASSAY THYROID STIM HORMONE: CPT | Performed by: FAMILY MEDICINE

## 2025-01-15 PROCEDURE — 82746 ASSAY OF FOLIC ACID SERUM: CPT | Performed by: FAMILY MEDICINE

## 2025-01-15 PROCEDURE — 83540 ASSAY OF IRON: CPT | Performed by: FAMILY MEDICINE

## 2025-01-15 PROCEDURE — 83540 ASSAY OF IRON: CPT | Mod: 59 | Performed by: FAMILY MEDICINE

## 2025-01-15 PROCEDURE — 83880 ASSAY OF NATRIURETIC PEPTIDE: CPT | Performed by: FAMILY MEDICINE

## 2025-01-15 PROCEDURE — 83550 IRON BINDING TEST: CPT | Performed by: FAMILY MEDICINE

## 2025-01-15 ASSESSMENT — ASTHMA QUESTIONNAIRES
QUESTION_2 LAST FOUR WEEKS HOW OFTEN HAVE YOU HAD SHORTNESS OF BREATH: MORE THAN ONCE A DAY
ACT_TOTALSCORE: 11
QUESTION_5 LAST FOUR WEEKS HOW WOULD YOU RATE YOUR ASTHMA CONTROL: SOMEWHAT CONTROLLED
ACT_TOTALSCORE: 11
QUESTION_3 LAST FOUR WEEKS HOW OFTEN DID YOUR ASTHMA SYMPTOMS (WHEEZING, COUGHING, SHORTNESS OF BREATH, CHEST TIGHTNESS OR PAIN) WAKE YOU UP AT NIGHT OR EARLIER THAN USUAL IN THE MORNING: TWO OR THREE NIGHTS A WEEK
EMERGENCY_ROOM_LAST_YEAR_TOTAL: ONE
QUESTION_4 LAST FOUR WEEKS HOW OFTEN HAVE YOU USED YOUR RESCUE INHALER OR NEBULIZER MEDICATION (SUCH AS ALBUTEROL): ONE OR TWO TIMES PER DAY
QUESTION_1 LAST FOUR WEEKS HOW MUCH OF THE TIME DID YOUR ASTHMA KEEP YOU FROM GETTING AS MUCH DONE AT WORK, SCHOOL OR AT HOME: SOME OF THE TIME

## 2025-01-15 NOTE — PROGRESS NOTES
{PROVIDER CHARTING PREFERENCE:318312}    Ky French is a 59 year old, presenting for the following health issues:  Anemia        1/15/2025     2:40 PM   Additional Questions   Roomed by Kaylene WELLINGTON   Accompanied by ELTON ASHFORD (Spouse)       Via the Health Maintenance questionnaire, the patient has reported the following services have been completed -[facility]ammogram: [facility] 2022-01-01, this information has been sent to the abstraction team.  History of Present Illness     Asthma:  She presents for follow up of asthma.  She has no cough, some wheezing, and some shortness of breath.  She is using a relief medication daily. She does not miss any doses of her controller medication throughout the week. Patient is aware of the following triggers: cold air, dust mites, gastric reflux, humidity, pollens and smoke. The patient has not had a visit to the Emergency Room, Urgent Care or Hospital due to asthma since the last clinic visit.     Diabetes:   She presents for follow up of diabetes.   She is checking home blood glucose with a continuous glucose monitor.   She checks blood glucose before meals, after meals, before and after meals and at bedtime.  Blood glucose is sometimes over 200 and never under 70. She is aware of hypoglycemia symptoms including shakiness, dizziness, weakness, lethargy, blurred vision and confusion.   She is concerned about other.   She is having numbness in feet, burning in feet, excessive thirst, blurry vision and weight loss.            Heart Failure:  She presents for follow up of heart failure. She is experiencing shortness of breath with rest and activity, with activity only and at night or when lying flat, which is same as usual. She is experiencing lower extremity edema which is same as usual.   She denies orthopenea and coughs at night. Patient is not checking weight daily. She has recently had a weight decrease.  She has side effects from medications including dizziness, fatigue,  cough, swelling and slow heartbeat.  She has had no other medical visits for heart failure since the last visit.    She eats 0-1 servings of fruits and vegetables daily.She consumes 0 sweetened beverage(s) daily.She exercises with enough effort to increase her heart rate 9 or less minutes per day.  She exercises with enough effort to increase her heart rate 3 or less days per week.   She is taking medications regularly.     Last Echo:   Echo result w/o MOPS: Interpretation Summary Technically difficult study. Poor acoustic windows. Left ventricular function is normal.The ejection fraction is 55-60%.Visual assessment suggests right ventricle function is normal though views arelimited.Small inferior vena cava size consistent with hypovolemia.No pericardial effusion is present.    Vomiting a couple weeks ago   Oxygen was 84 at the time   Oxygen took an hour to get back to 90s   Today is good on pulse oxymetry   Unclear when it happened   Scared her when this happened     Will get 2 minute walk test     [place] is not super abnormal   Says has an appointment with cardiology in feb or march   [facility] [place]           Objective    /66 (BP Location: Right arm, Patient Position: Sitting, Cuff Size: Adult Regular)   Pulse 80   Temp 97.5  F (36.4  C) (Temporal)   Resp 17   LMP 10/01/2013 (Approximate)   SpO2 97%   There is no height or weight on file to calculate BMI.  Physical Exam  Constitutional:       General: She is not in acute distress.  Eyes:      General: No scleral icterus.     Conjunctiva/sclera: Conjunctivae normal.   Cardiovascular:      Rate and Rhythm: Normal rate and regular rhythm.      Heart sounds: Normal heart sounds.   Pulmonary:      Effort: No respiratory distress.      Breath sounds: Normal breath sounds.   Neurological:      Mental Status: She is alert.   Psychiatric:         Mood and Affect: Mood normal.         Behavior: Behavior normal.                Signed Electronically by: Jose  DO Daniel  {Email feedback regarding this note to [email or web address]   :[id number]}

## 2025-01-16 LAB
BASOPHILS # BLD AUTO: 0 10E3/UL (ref 0–0.2)
BASOPHILS NFR BLD AUTO: 1 %
EOSINOPHIL # BLD AUTO: 0.1 10E3/UL (ref 0–0.7)
EOSINOPHIL NFR BLD AUTO: 2 %
ERYTHROCYTE [DISTWIDTH] IN BLOOD BY AUTOMATED COUNT: 20.9 % (ref 10–15)
FOLATE SERPL-MCNC: 12.6 NG/ML (ref 4.6–34.8)
HCT VFR BLD AUTO: 33.3 % (ref 35–47)
HGB BLD-MCNC: 9.1 G/DL (ref 11.7–15.7)
IMM GRANULOCYTES # BLD: 0 10E3/UL
IMM GRANULOCYTES NFR BLD: 0 %
IRON BINDING CAPACITY (ROCHE): 436 UG/DL (ref 240–430)
IRON SATN MFR SERPL: 91 % (ref 15–46)
IRON SERPL-MCNC: 398 UG/DL (ref 37–145)
LYMPHOCYTES # BLD AUTO: 0.9 10E3/UL (ref 0.8–5.3)
LYMPHOCYTES NFR BLD AUTO: 24 %
MCH RBC QN AUTO: 21.8 PG (ref 26.5–33)
MCHC RBC AUTO-ENTMCNC: 27.3 G/DL (ref 31.5–36.5)
MCV RBC AUTO: 80 FL (ref 78–100)
MONOCYTES # BLD AUTO: 0.3 10E3/UL (ref 0–1.3)
MONOCYTES NFR BLD AUTO: 7 %
NEUTROPHILS # BLD AUTO: 2.5 10E3/UL (ref 1.6–8.3)
NEUTROPHILS NFR BLD AUTO: 65 %
NRBC # BLD AUTO: 0 10E3/UL
NRBC BLD AUTO-RTO: 0 /100
PLATELET # BLD AUTO: 117 10E3/UL (ref 150–450)
RBC # BLD AUTO: 4.18 10E6/UL (ref 3.8–5.2)
VIT B12 SERPL-MCNC: 952 PG/ML (ref 232–1245)
WBC # BLD AUTO: 3.8 10E3/UL (ref 4–11)

## 2025-01-28 ENCOUNTER — TELEPHONE (OUTPATIENT)
Dept: FAMILY MEDICINE | Facility: CLINIC | Age: 60
End: 2025-01-28
Payer: COMMERCIAL

## 2025-01-28 NOTE — TELEPHONE ENCOUNTER
Pt calling stating that a few weeks ago she fell and had to go to the hospital. Pt calling to see if she would be able to get a sooner appointment than 2/14/25. Assisted in rescheduling for 1/5/25. Thanks      Ryanne Mckeon RN  [facility]

## 2025-01-31 ENCOUNTER — MYC MEDICAL ADVICE (OUTPATIENT)
Dept: UROLOGY | Facility: CLINIC | Age: 60
End: 2025-01-31
Payer: COMMERCIAL

## 2025-02-03 DIAGNOSIS — B35.4 TINEA CORPORIS: ICD-10-CM

## 2025-02-03 RX ORDER — CLOTRIMAZOLE 1 G/ML
SOLUTION TOPICAL 2 TIMES DAILY
Qty: 60 ML | Refills: 11 | Status: SHIPPED | OUTPATIENT
Start: 2025-02-03

## 2025-02-04 DIAGNOSIS — Z79.4 TYPE 2 DIABETES MELLITUS WITH COMPLICATION, WITH LONG-TERM CURRENT USE OF INSULIN (H): ICD-10-CM

## 2025-02-04 DIAGNOSIS — E11.8 TYPE 2 DIABETES MELLITUS WITH COMPLICATION, WITH LONG-TERM CURRENT USE OF INSULIN (H): ICD-10-CM

## 2025-02-05 DIAGNOSIS — F41.1 GAD (GENERALIZED ANXIETY DISORDER): ICD-10-CM

## 2025-02-05 RX ORDER — CLONAZEPAM 0.5 MG/1
0.5 TABLET ORAL 2 TIMES DAILY PRN
Qty: 60 TABLET | Refills: 5 | Status: SHIPPED | OUTPATIENT
Start: 2025-02-05

## 2025-02-05 RX ORDER — GABAPENTIN 600 MG/1
600 TABLET ORAL 3 TIMES DAILY
Qty: 90 TABLET | Refills: 3 | Status: SHIPPED | OUTPATIENT
Start: 2025-02-05

## 2025-02-05 NOTE — TELEPHONE ENCOUNTER
triamcinolone (NASACORT) 55 MCG/ACT nasal aerosol       Last Written Prescription Date:  08/23/2023  Last Fill Quantity: 16.9,   # refills: 11  Last Office Visit: 1/15/2025  Future Office visit:  2/5/2025    Routing refill request to provider for review/approval because:  Drug not active on patient's medication list

## 2025-02-10 DIAGNOSIS — J30.2 SEASONAL ALLERGIC RHINITIS, UNSPECIFIED TRIGGER: ICD-10-CM

## 2025-02-10 NOTE — TELEPHONE ENCOUNTER
Triamcinolone acetonide nasal aerosol 55MCG/ACT       Last Written Prescription Date:  08/02/2023  Last Fill Quantity: 16.9,   # refills: N/A  Last Office Visit: 1/15/2025  Future Office visit:   02/24/2025    Routing refill request to provider for review/approval because:

## 2025-02-11 RX ORDER — TRIAMCINOLONE ACETONIDE 55 UG/1
2 SPRAY, METERED NASAL DAILY
Qty: 16.9 ML | Refills: 11 | Status: SHIPPED | OUTPATIENT
Start: 2025-02-11

## 2025-02-20 ENCOUNTER — TELEPHONE (OUTPATIENT)
Dept: OPHTHALMOLOGY | Facility: CLINIC | Age: 60
End: 2025-02-20
Payer: COMMERCIAL

## 2025-02-20 ENCOUNTER — DOCUMENTATION ONLY (OUTPATIENT)
Dept: FAMILY MEDICINE | Facility: CLINIC | Age: 60
End: 2025-02-20
Payer: COMMERCIAL

## 2025-02-20 NOTE — TELEPHONE ENCOUNTER
Spoke with the patient - she fell and hit her head one month ago.  Since then, she feels her vision is decreased and an increase in floaters.  She is unsure if one eye or both eyes, but thinks it's both eyes.   No curtain-like loss of vision.    Appointment scheduled for 2-24-25 for a pressure check, and dilation.     2 = A lot of assistance

## 2025-02-20 NOTE — TELEPHONE ENCOUNTER
[facility] Call [facility]    Phone Message    May a detailed message be left on voicemail: yes     Reason for Call: Symptoms or Concerns     If patient has red-flag symptoms, warm transfer to triage line    Current symptom or concern: Pt states that she had a head injury about a month ago and ever since then she has had new floaters.    Symptoms have been present for:  1 month(s)    Has patient previously been seen for this? No    By : Dr. Spicer    Date: 8/6/24    Are there any new or worsening symptoms? Yes: New floaters    Action Taken: Message routed to:  Other: [facility]    Travel Screening: Not Applicable     Date of Service:

## 2025-02-24 ENCOUNTER — APPOINTMENT (OUTPATIENT)
Dept: OPTOMETRY | Facility: CLINIC | Age: 60
End: 2025-02-24
Payer: COMMERCIAL

## 2025-02-24 ENCOUNTER — OFFICE VISIT (OUTPATIENT)
Dept: OPHTHALMOLOGY | Facility: CLINIC | Age: 60
End: 2025-02-24
Payer: COMMERCIAL

## 2025-02-24 PROCEDURE — V2784 LENS POLYCARB OR EQUAL: HCPCS | Performed by: OPHTHALMOLOGY

## 2025-02-24 PROCEDURE — V2020 VISION SVCS FRAMES PURCHASES: HCPCS | Performed by: OPHTHALMOLOGY

## 2025-02-24 PROCEDURE — V2200 LENS SPHER BIFOC PLANO 4.00D: HCPCS | Mod: LT | Performed by: OPHTHALMOLOGY

## 2025-02-24 ASSESSMENT — VISUAL ACUITY
OD_CC+: -2
OD_CC: 20/40
OD_PH_CC+: +1
OD_PH_CC: 20/30
OS_CC: 20/250
CORRECTION_TYPE: GLASSES
METHOD: SNELLEN - LINEAR

## 2025-02-24 ASSESSMENT — EXTERNAL EXAM - RIGHT EYE: OD_EXAM: NORMAL

## 2025-02-24 ASSESSMENT — CUP TO DISC RATIO
OD_RATIO: 0.3
OS_RATIO: 0.1

## 2025-02-24 ASSESSMENT — SLIT LAMP EXAM - LIDS
COMMENTS: 1+ DERMATOCHALASIS, 1+ SCLERAL SHOW
COMMENTS: 1+ DERMATOCHALASIS, 1+ SCLERAL SHOW

## 2025-02-24 ASSESSMENT — TONOMETRY
IOP_METHOD: APPLANATION
OS_IOP_MMHG: 15
OD_IOP_MMHG: 12

## 2025-02-24 ASSESSMENT — EXTERNAL EXAM - LEFT EYE: OS_EXAM: NORMAL

## 2025-02-24 NOTE — Clinical Note
2/24/2025      Gillian Camara  [address]  [place] [address]      Dear Colleague,    Thank you for referring your patient, Gillian Camara, to the [facility]. Please see a copy of my visit note below.    No notes on file    Again, thank you for allowing me to participate in the care of your patient.        Sincerely,        Niraj Spicer MD    Electronically signed

## 2025-02-24 NOTE — PROGRESS NOTES
Current Eye Medications: OTC artificial tears - both eyes PRN      Subjective:  fell and hit her head 1 mo ago and loss consciousness per her . Floaters have worsened since the fall - says it looks she is looking through black pepper. She believes the floaters could possibly be in both eyes but is uncertain due to long history of poor vision in left eye.  Overall vision has been unchanged both eyes. Unaware of other eye issues after the fall.     Present with .     Type II DM - insulin dependent - states that blood sugar has been running high - in communication with endocrinologist - [facility].   Has appt with [facility] for Liver Cirrhosis tomorrow.    Last blood sugar - 400 this AM.  Lab Results   Component Value Date    A1C 10.9 11/26/2024    A1C >15.0 06/13/2024    A1C 13.4 01/17/2024          Objective:  See Ophthalmology Exam.       Assessment:      Plan:   See Patient Instructions.

## 2025-02-24 NOTE — PATIENT INSTRUCTIONS
Glasses Rx given - optional     Referral sent to the [facility] in [place] Their office will contact you to set up an appointment.     Niraj Spicer M.D.  [phone]     Patient Education   Diabetes weakens the blood vessels all over the body, including the eyes. Damage to the blood vessels in the eyes can cause swelling or bleeding into part of the eye (called the retina). This is called diabetic retinopathy (SARAH-tin--puh-thee). If not treated, this disease can cause vision loss or blindness.   Symptoms may include blurred or distorted vision, but many people have no symptoms. It's important to see your eye doctor regularly to check for problems.   Early treatment and good control can help protect your vision. Here are the things you can do to help prevent vision loss:      1. Keep your blood sugar levels under tight control.      2. Bring high blood pressure under control.      3. No smoking.      4. Have yearly dilated eye exams.

## 2025-02-25 PROBLEM — M54.2 ACUTE NECK PAIN: Status: RESOLVED | Noted: 2024-07-12 | Resolved: 2025-02-25

## 2025-02-25 PROBLEM — M54.50 ACUTE BILATERAL LOW BACK PAIN WITHOUT SCIATICA: Status: RESOLVED | Noted: 2024-07-12 | Resolved: 2025-02-25

## 2025-02-25 PROBLEM — M25.512 ACUTE PAIN OF LEFT SHOULDER: Status: RESOLVED | Noted: 2024-07-12 | Resolved: 2025-02-25

## 2025-03-15 ENCOUNTER — HEALTH MAINTENANCE LETTER (OUTPATIENT)
Age: 60
End: 2025-03-15

## 2025-03-25 ENCOUNTER — APPOINTMENT (OUTPATIENT)
Dept: OPTOMETRY | Facility: CLINIC | Age: 60
End: 2025-03-25
Payer: COMMERCIAL

## 2025-03-25 PROCEDURE — 92340 FIT SPECTACLES MONOFOCAL: CPT | Performed by: OPTOMETRIST

## 2025-03-30 ENCOUNTER — PATIENT OUTREACH (OUTPATIENT)
Dept: CARE COORDINATION | Facility: CLINIC | Age: 60
End: 2025-03-30
Payer: COMMERCIAL

## 2025-04-14 DIAGNOSIS — Z79.4 TYPE 2 DIABETES MELLITUS WITH COMPLICATION, WITH LONG-TERM CURRENT USE OF INSULIN (H): ICD-10-CM

## 2025-04-14 DIAGNOSIS — E11.8 TYPE 2 DIABETES MELLITUS WITH COMPLICATION, WITH LONG-TERM CURRENT USE OF INSULIN (H): ICD-10-CM

## 2025-04-24 ENCOUNTER — TELEPHONE (OUTPATIENT)
Dept: FAMILY MEDICINE | Facility: CLINIC | Age: 60
End: 2025-04-24
Payer: COMMERCIAL

## 2025-04-24 NOTE — TELEPHONE ENCOUNTER
Reason for Call:  Appointment Request    Patient requesting this type of appt:  Hospital/ED Follow-Up     Requested provider: Jose Sanchez    Reason patient unable to be scheduled: Not within requested timeframe    When does patient want to be seen/preferred time: 3-7 days    Comments: Erfoolow up heart/ low o2/ blocked ateries    Could we [facility] you [facility] or would you prefer to receive a phone call?:   Patient would prefer a phone call   Okay to leave a detailed message?: Yes at Home number on file [phone] (home)    Call taken on 4/24/2025 at 1:57 PM by Gracy Michel

## 2025-04-24 NOTE — TELEPHONE ENCOUNTER
Reason for Call:  Appointment Request    Patient requesting this type of appt:  Hospital/ED Follow-Up     Requested provider: Jose Sanchez    Reason patient unable to be scheduled: Not within requested timeframe    When does patient want to be seen/preferred time: 3-7 days    Comments: I did not see any appointments with other providers. Offered patient to see another provider, patient would like to see PCP. Routing back to clinic to assist with scheduling.    Could we send this information to you in [employer] or would you prefer to receive a phone call?:   Patient would prefer a phone call   Okay to leave a detailed message?: Yes at Cell number on file:    Telephone Information:   Mobile [phone]       Call taken on 4/24/2025 at 3:13 PM by Vicky King

## 2025-04-25 ENCOUNTER — MEDICAL CORRESPONDENCE (OUTPATIENT)
Dept: HEALTH INFORMATION MANAGEMENT | Facility: CLINIC | Age: 60
End: 2025-04-25

## 2025-05-01 ENCOUNTER — TELEPHONE (OUTPATIENT)
Dept: FAMILY MEDICINE | Facility: CLINIC | Age: 60
End: 2025-05-01

## 2025-05-01 ENCOUNTER — OFFICE VISIT (OUTPATIENT)
Dept: FAMILY MEDICINE | Facility: CLINIC | Age: 60
End: 2025-05-01
Payer: COMMERCIAL

## 2025-05-01 VITALS
HEIGHT: 63 IN | WEIGHT: 168 LBS | BODY MASS INDEX: 29.77 KG/M2 | HEART RATE: 85 BPM | TEMPERATURE: 97.4 F | DIASTOLIC BLOOD PRESSURE: 54 MMHG | RESPIRATION RATE: 15 BRPM | SYSTOLIC BLOOD PRESSURE: 132 MMHG | OXYGEN SATURATION: 100 %

## 2025-05-01 DIAGNOSIS — K92.2 GASTROINTESTINAL HEMORRHAGE, UNSPECIFIED GASTROINTESTINAL HEMORRHAGE TYPE: ICD-10-CM

## 2025-05-01 DIAGNOSIS — E11.8 TYPE 2 DIABETES MELLITUS WITH COMPLICATION, WITH LONG-TERM CURRENT USE OF INSULIN (H): Primary | ICD-10-CM

## 2025-05-01 DIAGNOSIS — Z79.4 TYPE 2 DIABETES MELLITUS WITH COMPLICATION, WITH LONG-TERM CURRENT USE OF INSULIN (H): Primary | ICD-10-CM

## 2025-05-01 LAB
BASOPHILS # BLD AUTO: 0 10E3/UL (ref 0–0.2)
BASOPHILS NFR BLD AUTO: 1 %
EOSINOPHIL # BLD AUTO: 0.1 10E3/UL (ref 0–0.7)
EOSINOPHIL NFR BLD AUTO: 2 %
ERYTHROCYTE [DISTWIDTH] IN BLOOD BY AUTOMATED COUNT: 17.2 % (ref 10–15)
HCT VFR BLD AUTO: 30.8 % (ref 35–47)
HGB BLD-MCNC: 8.7 G/DL (ref 11.7–15.7)
IMM GRANULOCYTES # BLD: 0 10E3/UL
IMM GRANULOCYTES NFR BLD: 0 %
LYMPHOCYTES # BLD AUTO: 0.9 10E3/UL (ref 0.8–5.3)
LYMPHOCYTES NFR BLD AUTO: 22 %
MCH RBC QN AUTO: 23.3 PG (ref 26.5–33)
MCHC RBC AUTO-ENTMCNC: 28.2 G/DL (ref 31.5–36.5)
MCV RBC AUTO: 83 FL (ref 78–100)
MONOCYTES # BLD AUTO: 0.4 10E3/UL (ref 0–1.3)
MONOCYTES NFR BLD AUTO: 9 %
NEUTROPHILS # BLD AUTO: 2.8 10E3/UL (ref 1.6–8.3)
NEUTROPHILS NFR BLD AUTO: 66 %
PLATELET # BLD AUTO: 150 10E3/UL (ref 150–450)
RBC # BLD AUTO: 3.73 10E6/UL (ref 3.8–5.2)
WBC # BLD AUTO: 4.2 10E3/UL (ref 4–11)

## 2025-05-01 RX ORDER — POTASSIUM CHLORIDE 750 MG/1
20 TABLET, EXTENDED RELEASE ORAL
COMMUNITY
Start: 2025-04-23

## 2025-05-01 RX ORDER — ISOSORBIDE MONONITRATE 30 MG/1
30 TABLET, EXTENDED RELEASE ORAL DAILY
COMMUNITY

## 2025-05-01 RX ORDER — ASPIRIN 81 MG/1
81 TABLET ORAL DAILY
COMMUNITY

## 2025-05-01 ASSESSMENT — ASTHMA QUESTIONNAIRES
QUESTION_5 LAST FOUR WEEKS HOW WOULD YOU RATE YOUR ASTHMA CONTROL: SOMEWHAT CONTROLLED
QUESTION_4 LAST FOUR WEEKS HOW OFTEN HAVE YOU USED YOUR RESCUE INHALER OR NEBULIZER MEDICATION (SUCH AS ALBUTEROL): TWO OR THREE TIMES PER WEEK
QUESTION_2 LAST FOUR WEEKS HOW OFTEN HAVE YOU HAD SHORTNESS OF BREATH: MORE THAN ONCE A DAY
ACT_TOTALSCORE: 13
QUESTION_1 LAST FOUR WEEKS HOW MUCH OF THE TIME DID YOUR ASTHMA KEEP YOU FROM GETTING AS MUCH DONE AT WORK, SCHOOL OR AT HOME: SOME OF THE TIME
QUESTION_3 LAST FOUR WEEKS HOW OFTEN DID YOUR ASTHMA SYMPTOMS (WHEEZING, COUGHING, SHORTNESS OF BREATH, CHEST TIGHTNESS OR PAIN) WAKE YOU UP AT NIGHT OR EARLIER THAN USUAL IN THE MORNING: ONCE A WEEK

## 2025-05-01 ASSESSMENT — PATIENT HEALTH QUESTIONNAIRE - PHQ9
SUM OF ALL RESPONSES TO PHQ QUESTIONS 1-9: 12
10. IF YOU CHECKED OFF ANY PROBLEMS, HOW DIFFICULT HAVE THESE PROBLEMS MADE IT FOR YOU TO DO YOUR WORK, TAKE CARE OF THINGS AT HOME, OR GET ALONG WITH OTHER PEOPLE: NOT DIFFICULT AT ALL
SUM OF ALL RESPONSES TO PHQ QUESTIONS 1-9: 12

## 2025-05-01 ASSESSMENT — PAIN SCALES - GENERAL: PAINLEVEL_OUTOF10: MODERATE PAIN (4)

## 2025-05-01 NOTE — TELEPHONE ENCOUNTER
Forms/Letter Request    Type of form/letter: Home Health Certification      Do we have the form/letter: Yes: dr. Daniel mendez    Who is the form from? Home care    Where did/will the form come from? form was faxed in    When is form/letter needed by: asap    How would you like the form/letter returned: Fax : [phone]

## 2025-05-01 NOTE — PROGRESS NOTES
"  Assessment & Plan     Type 2 diabetes mellitus with complication, with long-term current use of insulin (H)  - Diabetes management includes the use of Jardiance, which helps with both diabetes and heart failure by promoting the excretion of excess sugar. The patient has experienced episodes of low blood sugar and is following a Novolog dosing chart.  - Monitor for signs of yeast infections or itching due to Jardiance. Continue following the Novolog dosing chart. No additional blood work needed today.  - Risks and side effects: Monitor for yeast infections or itching as potential side effects of Jardiance.    Gastrointestinal hemorrhage, unspecified gastrointestinal hemorrhage type  - Recent hospitalization from April 19 to April 23, 2025, due to esophageal bleed and anemia, which contributed to heart failure exacerbation. Varices were banded, and a polyp was removed during a colonoscopy.  - Skilled nursing and physical therapy at home. Cardiac therapy ongoing. Monitor for any signs of gastrointestinal bleeding.    Consent was obtained from the patient to use an AI documentation tool in the creation of this note.        MED REC REQUIRED  Post Medication Reconciliation Status: discharge medications reconciled, continue medications without change  BMI  Estimated body mass index is 29.77 kg/m  as calculated from the following:    Height as of this encounter: 1.6 m (5' 2.99\").    Weight as of this encounter: 76.2 kg (168 lb).       Ky French is a 59 year old, presenting for the following health issues:  Hospital F/U        5/1/2025     9:09 AM   Additional Questions   Roomed by Lauren MARIA   Accompanied by /caregiver     JUAN J MORGAN White, 59 years    Heart Failure and Multi-Vessel Disease  - Hospitalized from April 19, 2025, to April 23, 2025, at [facility] for difficulty breathing, ongoing chest discomfort, and acute systolic heart failure  - History of esophageal bleed and varices banding in November " "2024  - Multi-vessel disease identified; no stent placed due to small vessels and chronic anemia  - Anemia linked to heart failure exacerbation; received 3 units of blood transfusion  - No intervention completed due to small vessels and chronic anemia    Asthma and Respiratory Issues  - Recent hospitalization due to suspected asthma exacerbation and vomiting  - Blood oxygen level dropped to 83%, improved with nebulizer use  - Vomiting possibly linked to gastrointestinal bleed    Diabetes Management  - Recent consultation with endocrinologist on April 29, 2025, at [facility]  - Blood sugar levels have been high; recent incidents of low blood sugar (58 and 60)  - Adjusted Novolog dosing following endocrinologist's advice    Misc  - Experienced emotional distress regarding potential bypass surgery, later informed not a candidate for bypass      Hospital Follow-up Visit:    Hospital/Nursing Home/IP Rehab Facility:  [facility]  Date of Admission: 4/19/25  Date of Discharge: 4/23/25  Reason(s) for Admission: gi bleed, hfimpef  Was the patient in the ICU or did the patient experience delirium during hospitalization?  No  Do you have any other stressors you would like to discuss with your provider? No    Problems taking medications regularly:  None  Medication changes since discharge: Started Aspirin 81mg, Jardiance, and increased furosemide  Problems adhering to non-medication therapy:  None    Summary of hospitalization:  [facility] discharge summary reviewed  Diagnostic Tests/Treatments reviewed.  Follow up needed: none  Other Healthcare Providers Involved in Patient s Care:         None  Update since discharge: improved.       Plan of care communicated with patient         Objective    /54 (BP Location: Right arm, Patient Position: Sitting, Cuff Size: Adult Regular)   Pulse 85   Temp 97.4  F (36.3  C) (Temporal)   Resp 15   Ht 1.6 m (5' 2.99\")   Wt 76.2 kg (168 lb)   LMP " 10/01/2013 (Approximate)   SpO2 100%   BMI 29.77 kg/m    Body mass index is 29.77 kg/m .  Physical Exam  Constitutional:       General: She is not in acute distress.  Eyes:      General: No scleral icterus.  Pulmonary:      Effort: No respiratory distress.   Neurological:      Mental Status: She is alert.   Psychiatric:         Mood and Affect: Mood normal.         Behavior: Behavior normal.                Signed Electronically by: Jose Sanchez DO    Answers submitted by the patient for this visit:  Patient Health Questionnaire (Submitted on 5/1/2025)  If you checked off any problems, how difficult have these problems made it for you to do your work, take care of things at home, or get along with other people?: Not difficult at all  PHQ9 TOTAL SCORE: 12

## 2025-05-07 DIAGNOSIS — I10 ESSENTIAL HYPERTENSION: ICD-10-CM

## 2025-05-07 DIAGNOSIS — E11.8 TYPE 2 DIABETES MELLITUS WITH COMPLICATION, WITH LONG-TERM CURRENT USE OF INSULIN (H): ICD-10-CM

## 2025-05-07 DIAGNOSIS — G25.81 RESTLESS LEG SYNDROME: ICD-10-CM

## 2025-05-07 DIAGNOSIS — J45.30 MILD PERSISTENT ASTHMA WITHOUT COMPLICATION: ICD-10-CM

## 2025-05-07 DIAGNOSIS — Z79.4 TYPE 2 DIABETES MELLITUS WITH COMPLICATION, WITH LONG-TERM CURRENT USE OF INSULIN (H): ICD-10-CM

## 2025-05-07 RX ORDER — EZETIMIBE 10 MG/1
10 TABLET ORAL DAILY
Qty: 90 TABLET | Refills: 2 | Status: SHIPPED | OUTPATIENT
Start: 2025-05-07

## 2025-05-07 RX ORDER — LOSARTAN POTASSIUM 50 MG/1
50 TABLET ORAL DAILY
Qty: 90 TABLET | Refills: 1 | Status: SHIPPED | OUTPATIENT
Start: 2025-05-07

## 2025-05-07 RX ORDER — PRAMIPEXOLE DIHYDROCHLORIDE 0.25 MG/1
0.5 TABLET ORAL AT BEDTIME
Qty: 180 TABLET | Refills: 2 | Status: SHIPPED | OUTPATIENT
Start: 2025-05-07

## 2025-05-07 RX ORDER — FLUTICASONE FUROATE 200 UG/1
1 POWDER RESPIRATORY (INHALATION) AT BEDTIME
Qty: 30 EACH | Refills: 11 | Status: SHIPPED | OUTPATIENT
Start: 2025-05-07

## 2025-05-08 ENCOUNTER — TELEPHONE (OUTPATIENT)
Dept: FAMILY MEDICINE | Facility: CLINIC | Age: 60
End: 2025-05-08
Payer: COMMERCIAL

## 2025-05-08 ENCOUNTER — DOCUMENTATION ONLY (OUTPATIENT)
Dept: OTHER | Facility: CLINIC | Age: 60
End: 2025-05-08
Payer: COMMERCIAL

## 2025-05-08 DIAGNOSIS — E87.6 HYPOKALEMIA: Primary | ICD-10-CM

## 2025-05-08 RX ORDER — POTASSIUM CHLORIDE 750 MG/1
20 TABLET, EXTENDED RELEASE ORAL DAILY
Qty: 180 TABLET | Refills: 3 | Status: SHIPPED | OUTPATIENT
Start: 2025-05-08

## 2025-05-08 NOTE — TELEPHONE ENCOUNTER
Pt calling to see if she should be continuing to take Klor-Con 10MEQ?  Pt hospitalized 4/19-4/23 for acute on chronic HFrER and discharged with a script of Klor-Con.     If okay to continue - please sent refill to [facility]    Thanks   CROW Lafleur

## 2025-05-08 NOTE — TELEPHONE ENCOUNTER
Reason for Call (Form)      Type of Letter, Form, or Note: [facility] - 5/2/25 orders     Form Origin: [facility]     Received From: fax    Placed At: Form has been completed by Dr. Sanchez and faxed to [phone]. Abstracted

## 2025-05-14 ENCOUNTER — TELEPHONE (OUTPATIENT)
Dept: FAMILY MEDICINE | Facility: CLINIC | Age: 60
End: 2025-05-14
Payer: COMMERCIAL

## 2025-05-14 ENCOUNTER — PATIENT OUTREACH (OUTPATIENT)
Dept: CARE COORDINATION | Facility: CLINIC | Age: 60
End: 2025-05-14
Payer: COMMERCIAL

## 2025-05-14 NOTE — TELEPHONE ENCOUNTER
Decrease losartan to 25mg daily.  See if this feels better. Please have PT see her again in about a week to evaluate how she is doing then.      Jose Sanchez, DO

## 2025-05-14 NOTE — TELEPHONE ENCOUNTER
Home care PT calling to relay pt stated she was feeling dizzy today and they did a couple of BP checks of 100/41 prior to exercises and after exercises 99/40. Pulse and oxygen were both normal during this time as well. Pt also reported staying dizzy throughout all exercises, and she states this is not new but has been intermittent for awhile now per pt.    Previous BP on 5/6 was 102 systolic and back in April she was 123 systolic per PT. Pt also declines any recent med changes.    Please advise,    Callback: [phone] and ok to leave message.    Thanks!  Israel CORDOVA RN   [facility]

## 2025-05-15 NOTE — TELEPHONE ENCOUNTER
Called Pt and relayed message. Also called pt and relayed message. Thanks    Ryanne Mckeon RN  [facility]

## 2025-05-21 ENCOUNTER — MEDICAL CORRESPONDENCE (OUTPATIENT)
Dept: HEALTH INFORMATION MANAGEMENT | Facility: CLINIC | Age: 60
End: 2025-05-21

## 2025-05-28 ENCOUNTER — TELEPHONE (OUTPATIENT)
Dept: FAMILY MEDICINE | Facility: CLINIC | Age: 60
End: 2025-05-28
Payer: COMMERCIAL

## 2025-05-28 ENCOUNTER — PATIENT OUTREACH (OUTPATIENT)
Dept: CARE COORDINATION | Facility: CLINIC | Age: 60
End: 2025-05-28
Payer: COMMERCIAL

## 2025-05-28 ENCOUNTER — MEDICAL CORRESPONDENCE (OUTPATIENT)
Dept: HEALTH INFORMATION MANAGEMENT | Facility: CLINIC | Age: 60
End: 2025-05-28
Payer: COMMERCIAL

## 2025-05-28 NOTE — TELEPHONE ENCOUNTER
Forms/Letter Request    Type of form/letter: [facility] ICD-10 Diagnosis Verification Form     Who is the form from? Rafa Preciado [facility], specifically Shellie Chandler ()    Where did/will the form come from? Form was faxed in    When is form/letter needed by: ASAP    How would you like the form/letter returned: Faxed to [phone] or emailed to [email or web address]    Where form is located: [employer] tena sign basket    Patient Notified form requests are processed in 5-7 business days: N/A    Could we send this information to you in [employer] or would you prefer to receive a phone call?: N/A    Additional Information: N/A

## 2025-06-02 ENCOUNTER — NURSE TRIAGE (OUTPATIENT)
Dept: FAMILY MEDICINE | Facility: CLINIC | Age: 60
End: 2025-06-02

## 2025-06-02 NOTE — TELEPHONE ENCOUNTER
Dr Nathan  Pt scheduled a v visit with you for today  Sent triage as an FYI for your visit today  Has been compliant with all meds, blood sugars are in her normal range, this am was 343, but she hasn't taken her insulin yet this am, advised to make sure to take all meds as directed, 7 and 14 day average is 295,these are better as she has been better with her diet, advised to assure regular meals  She feels a bit weak this am,she is feeling better than earlier this morning, no vomiting today  No burning or blood with urination, normal urine  She rechecked her BP now, 5 mins after last bp check, 94/56, pulse 110  Reason for Disposition   MILD to MODERATE vomiting (e.g., 1-5 times/day) and lasts > 48 hours (2 days)    Additional Information   Negative: Shock suspected (e.g., cold/pale/clammy skin, too weak to stand, low BP, rapid pulse)   Negative: Difficult to awaken or acting confused (e.g., disoriented, slurred speech)   Negative: Sounds like a life-threatening emergency to the triager   Negative: Vomiting occurs only while coughing   Negative: Pregnant < 20 Weeks and nausea/vomiting began in early pregnancy (i.e., 4-8 weeks pregnant)   Negative: Chest pain   Negative: Headache is main symptom   Negative: Vomiting red blood or black (coffee ground) material   Negative: Vomiting and hernia is more painful or swollen than usual   Negative: Recent head injury (within 3 days)   Negative: Recent abdominal injury (within 7 days)   Negative: Insulin-dependent diabetes and glucose > 240 mg/dL (13 mmol/L)   Negative: Severe pain in one eye   Negative: SEVERE vomiting (e.g., 6 or more times/day)  (Exception: Patient sounds well, is drinking liquids, does not sound dehydrated, and vomiting has lasted less than 24 hours.)   Negative: MODERATE vomiting (e.g., 3 - 5 times/day) and age > 60 years   Negative: Constant abdominal pain lasting > 2 hours   Negative: High-risk adult (e.g., brain tumor, V-P shunt, hernia)    "Negative: Drinking very little and has signs of dehydration (e.g., no urine > 12 hours, very dry mouth, very lightheaded)   Negative: Patient sounds very sick or weak to the triager   Negative: Vomiting and abdomen looks much more swollen than usual   Negative: Vomiting contains bile (green color)   Negative: Fever > 103 F (39.4 C)   Negative: Fever > 101 F (38.3 C) and over 60 years of age   Negative: Fever > 100 F (37.8 C) and has a weak immune system (e.g., HIV positive, cancer chemo, organ transplant, splenectomy, chronic steroids)   Negative: Fever > 100 F (37.8 C) and bedridden (e.g., CVA, chronic illness, recovering from surgery)   Negative: Taking any of the following medications: digoxin (Lanoxin), lithium, theophylline, phenytoin (Dilantin)   Negative: Severe headache and vomiting    Answer Assessment - Initial Assessment Questions  1. VOMITING SEVERITY: \"How many times have you vomited in the past 24 hours?\"       3 times yesterday, but nothing today, feels nauseated  2. ONSET: \"When did the vomiting begin?\"       yesterday  3. FLUIDS: \"What fluids or food have you vomited up today?\" \"Have you been able to keep any fluids down?\"      2 hours after eating yesterday is when I first threw up, the other 1 times a few hours later in the afternoon and then in the night, early this am  4. ABDOMEN PAIN: \"Are your having any abdomen pain?\" If Yes : \"How bad is it and what does it feel like?\" (e.g., crampy, dull, intermittent, constant)       No, no cramping  5. DIARRHEA: \"Is there any diarrhea?\" If Yes, ask: \"How many times today?\"       Some loose bowels, soft formed, normal color  6. CONTACTS: \"Is there anyone else in the family with the same symptoms?\"       no  7. CAUSE: \"What do you think is causing your vomiting?\"      Sometimes with asthma, she sometimes will vomit, her O2 sat was in the 95, which is normal for her, she wasn't able to check today as her watch band had broken, her husbands watch says O2 sat " "is at 94, room air  BP last night, 129/102, p. 106, just a few minutes early it was 110/58, p103, current is 98/54 p 113  8. HYDRATION STATUS: \"Any signs of dehydration?\" (e.g., dry mouth [not only dry lips], too weak to stand) \"When did you last urinate?\"      Drinking adequate fluids, this morning about 2 hours ago  9. OTHER SYMPTOMS: \"Do you have any other symptoms?\" (e.g., fever, headache, vertigo, vomiting blood or coffee grounds, recent head injury)      No other symptoms noted, had a headache about a week ago, she has also has what she stated bothersome earache, but not bad  10. PREGNANCY: \"Is there any chance you are pregnant?\" \"When was your last menstrual period?\"        no    Protocols used: Vomiting-A-OH  Polly Stacy RN   [facility]      "

## 2025-06-04 ENCOUNTER — TELEPHONE (OUTPATIENT)
Dept: FAMILY MEDICINE | Facility: CLINIC | Age: 60
End: 2025-06-04

## 2025-06-04 NOTE — TELEPHONE ENCOUNTER
Forms/Letter Request    Type of form/letter: Physician Order, Hospital Hold (6/2/25)     Who is the form from? [facility]    Where did/will the form come from? Form was faxed in    When is form/letter needed by: 6/11/25    How would you like the form/letter returned: Fax to [phone]    Where form is located: Brown to sign basket    Patient Notified form requests are processed in 5-7 business days: N/A    Could we send this information to you in [facility] or would you prefer to receive a phone call?: N/A    Additional Information: N/A

## 2025-06-11 ENCOUNTER — MEDICAL CORRESPONDENCE (OUTPATIENT)
Dept: HEALTH INFORMATION MANAGEMENT | Facility: CLINIC | Age: 60
End: 2025-06-11
Payer: COMMERCIAL

## 2025-06-16 ENCOUNTER — TELEPHONE (OUTPATIENT)
Dept: FAMILY MEDICINE | Facility: CLINIC | Age: 60
End: 2025-06-16
Payer: COMMERCIAL

## 2025-06-16 DIAGNOSIS — L85.3 DRY SKIN DERMATITIS: ICD-10-CM

## 2025-06-16 RX ORDER — AMMONIUM LACTATE 12 G/100G
LOTION TOPICAL PRN
Qty: 500 G | Refills: 1 | Status: SHIPPED | OUTPATIENT
Start: 2025-06-16

## 2025-06-16 NOTE — TELEPHONE ENCOUNTER
Reason for Call (Form)    Goal: Our goal is to have forms completed within 72 hours. However, some forms may require a visit or additional information.    Type of Letter, Form, or Note: Order #[id number]    Received From: [employer], via fax    Placed At: Dr. Sanchez, to sign basket    Additional Comments:    Return fax: [phone]

## 2025-06-17 ENCOUNTER — MEDICAL CORRESPONDENCE (OUTPATIENT)
Dept: HEALTH INFORMATION MANAGEMENT | Facility: CLINIC | Age: 60
End: 2025-06-17
Payer: COMMERCIAL

## 2025-06-24 ENCOUNTER — TRANSFERRED RECORDS (OUTPATIENT)
Dept: HEALTH INFORMATION MANAGEMENT | Facility: CLINIC | Age: 60
End: 2025-06-24
Payer: COMMERCIAL

## 2025-06-24 LAB — RETINOPATHY: POSITIVE

## 2025-06-28 ENCOUNTER — HEALTH MAINTENANCE LETTER (OUTPATIENT)
Age: 60
End: 2025-06-28

## 2025-07-01 ENCOUNTER — MEDICAL CORRESPONDENCE (OUTPATIENT)
Dept: HEALTH INFORMATION MANAGEMENT | Facility: CLINIC | Age: 60
End: 2025-07-01
Payer: COMMERCIAL

## 2025-07-02 ENCOUNTER — OFFICE VISIT (OUTPATIENT)
Dept: FAMILY MEDICINE | Facility: CLINIC | Age: 60
End: 2025-07-02
Payer: COMMERCIAL

## 2025-07-02 ENCOUNTER — TELEPHONE (OUTPATIENT)
Dept: FAMILY MEDICINE | Facility: CLINIC | Age: 60
End: 2025-07-02

## 2025-07-02 VITALS
HEIGHT: 63 IN | HEART RATE: 95 BPM | DIASTOLIC BLOOD PRESSURE: 54 MMHG | SYSTOLIC BLOOD PRESSURE: 92 MMHG | OXYGEN SATURATION: 100 % | BODY MASS INDEX: 29.06 KG/M2 | WEIGHT: 164 LBS | TEMPERATURE: 97.9 F

## 2025-07-02 DIAGNOSIS — M54.2 NECK PAIN: ICD-10-CM

## 2025-07-02 DIAGNOSIS — Z79.4 TYPE 2 DIABETES MELLITUS WITH COMPLICATION, WITH LONG-TERM CURRENT USE OF INSULIN (H): ICD-10-CM

## 2025-07-02 DIAGNOSIS — Z01.818 PREOPERATIVE EXAMINATION: Primary | ICD-10-CM

## 2025-07-02 DIAGNOSIS — E11.8 TYPE 2 DIABETES MELLITUS WITH COMPLICATION, WITH LONG-TERM CURRENT USE OF INSULIN (H): ICD-10-CM

## 2025-07-02 DIAGNOSIS — I85.00 ESOPHAGEAL VARICES WITHOUT BLEEDING, UNSPECIFIED ESOPHAGEAL VARICES TYPE (H): ICD-10-CM

## 2025-07-02 DIAGNOSIS — I10 ESSENTIAL HYPERTENSION: ICD-10-CM

## 2025-07-02 LAB
ERYTHROCYTE [DISTWIDTH] IN BLOOD BY AUTOMATED COUNT: 17.7 % (ref 10–15)
EST. AVERAGE GLUCOSE BLD GHB EST-MCNC: 194 MG/DL
HBA1C MFR BLD: 8.4 % (ref 0–5.6)
HCT VFR BLD AUTO: 23.9 % (ref 35–47)
HGB BLD-MCNC: 6.5 G/DL (ref 11.7–15.7)
MCH RBC QN AUTO: 22.6 PG (ref 26.5–33)
MCHC RBC AUTO-ENTMCNC: 27.2 G/DL (ref 31.5–36.5)
MCV RBC AUTO: 83 FL (ref 78–100)
PLATELET # BLD AUTO: 130 10E3/UL (ref 150–450)
RBC # BLD AUTO: 2.87 10E6/UL (ref 3.8–5.2)
WBC # BLD AUTO: 5.6 10E3/UL (ref 4–11)

## 2025-07-02 PROCEDURE — 3074F SYST BP LT 130 MM HG: CPT | Performed by: FAMILY MEDICINE

## 2025-07-02 PROCEDURE — 85027 COMPLETE CBC AUTOMATED: CPT | Performed by: FAMILY MEDICINE

## 2025-07-02 PROCEDURE — 3052F HG A1C>EQUAL 8.0%<EQUAL 9.0%: CPT | Performed by: FAMILY MEDICINE

## 2025-07-02 PROCEDURE — 99215 OFFICE O/P EST HI 40 MIN: CPT | Performed by: FAMILY MEDICINE

## 2025-07-02 PROCEDURE — 3078F DIAST BP <80 MM HG: CPT | Performed by: FAMILY MEDICINE

## 2025-07-02 PROCEDURE — 83036 HEMOGLOBIN GLYCOSYLATED A1C: CPT | Performed by: FAMILY MEDICINE

## 2025-07-02 PROCEDURE — 36415 COLL VENOUS BLD VENIPUNCTURE: CPT | Performed by: FAMILY MEDICINE

## 2025-07-02 PROCEDURE — 99207 PR FOOT EXAM NO CHARGE: CPT | Performed by: FAMILY MEDICINE

## 2025-07-02 PROCEDURE — 1125F AMNT PAIN NOTED PAIN PRSNT: CPT | Performed by: FAMILY MEDICINE

## 2025-07-02 ASSESSMENT — ASTHMA QUESTIONNAIRES
QUESTION_2 LAST FOUR WEEKS HOW OFTEN HAVE YOU HAD SHORTNESS OF BREATH: ONCE A DAY
QUESTION_4 LAST FOUR WEEKS HOW OFTEN HAVE YOU USED YOUR RESCUE INHALER OR NEBULIZER MEDICATION (SUCH AS ALBUTEROL): ONE OR TWO TIMES PER DAY
QUESTION_1 LAST FOUR WEEKS HOW MUCH OF THE TIME DID YOUR ASTHMA KEEP YOU FROM GETTING AS MUCH DONE AT WORK, SCHOOL OR AT HOME: MOST OF THE TIME
QUESTION_3 LAST FOUR WEEKS HOW OFTEN DID YOUR ASTHMA SYMPTOMS (WHEEZING, COUGHING, SHORTNESS OF BREATH, CHEST TIGHTNESS OR PAIN) WAKE YOU UP AT NIGHT OR EARLIER THAN USUAL IN THE MORNING: TWO OR THREE NIGHTS A WEEK
ACT_TOTALSCORE: 11
QUESTION_5 LAST FOUR WEEKS HOW WOULD YOU RATE YOUR ASTHMA CONTROL: SOMEWHAT CONTROLLED
HOSPITALIZATION_OVERNIGHT_LAST_YEAR_TOTAL: ONE

## 2025-07-02 ASSESSMENT — PAIN SCALES - GENERAL: PAINLEVEL_OUTOF10: MODERATE PAIN (6)

## 2025-07-02 ASSESSMENT — PATIENT HEALTH QUESTIONNAIRE - PHQ9
SUM OF ALL RESPONSES TO PHQ QUESTIONS 1-9: 16
10. IF YOU CHECKED OFF ANY PROBLEMS, HOW DIFFICULT HAVE THESE PROBLEMS MADE IT FOR YOU TO DO YOUR WORK, TAKE CARE OF THINGS AT HOME, OR GET ALONG WITH OTHER PEOPLE: VERY DIFFICULT
SUM OF ALL RESPONSES TO PHQ QUESTIONS 1-9: 16

## 2025-07-02 NOTE — TELEPHONE ENCOUNTER
Called pt went straight to voicemail, per chart is currently in [facility] emergency room.    Thanks!  Israel CORDOVA RN   [facility]

## 2025-07-02 NOTE — PROGRESS NOTES
Preoperative Evaluation  [facility]  [address]  [address]  [facility] [address]  Phone: [phone]  Fax: [phone]  Primary Provider: Jose Sanchez DO  Pre-op Performing Provider: Jose Sanchez DO  Jul 2, 2025 7/2/2025   Surgical Information   What procedure is being done? gastroendoscop?   Facility or Hospital where procedure/surgery will be performed: [facility]   Who is doing the procedure / surgery? uncertain   Date of surgery / procedure: not scheduled   Time of surgery / procedure: uncertain   Where do you plan to recover after surgery? at home with family     Fax number for surgical facility: Note does not need to be faxed, will be available electronically in Epic.    Assessment & Plan     The proposed surgical procedure is considered LOW risk.    High risk for low risk procedure.  Will check hgb today.    Should work with GI to determine if she should stop her aspirin and plavix given the tenuous nature of her coronary arteries.    Has several narrowed coronary arteries, however, cannot get revascularization at this time.  Needs to see cardiology for further evaluation.      Preoperative examination:  - Preoperative status discussed, with emphasis on medication management before the procedure.  - Ask about aspirin and Plavix management before the procedure.    Esophageal varices without bleeding:  - Esophageal varices present, previously banded for preventative measures.  - Follow-up EGD to recheck varices.    Type 2 diabetes mellitus with complication, with long-term current use of insulin:  - Diabetes management ongoing with insulin.  - Order A1c test to monitor diabetes control.    Neck pain:  - Neck pain aggravated since January.  - Consideration for physical therapy.    Consent was obtained from the patient to use an AI documentation tool in the creation of this note.       - No identified additional risk factors other than previously  addressed      Recommendation  Approval given to proceed with proposed procedure, without further diagnostic evaluation.      Ky French is a 59 year old, presenting for the following:  Pre-Op Exam          7/2/2025     1:27 PM   Additional Questions   Roomed by Anthony SIMON         7/2/2025   Forms   Any forms needing to be completed Yes     HPI:           7/2/2025   Pre-Op Questionnaire   Have you ever had a heart attack or stroke? (!) YES most recent NSTEMI 6/2/25.  On ASA and plavix    Have you ever had surgery on your heart or blood vessels, such as a stent placement, a coronary artery bypass, or surgery on an artery in your head, neck, heart, or legs? No   Do you have chest pain with activity? No   Do you have a history of heart failure? (!) YES last EF 45-50% 6/3/25 at [facility].  Apical akinesis    Do you currently have a cold, bronchitis or symptoms of other infection? No   Do you have a cough, shortness of breath, or wheezing? (!) YES heart failure    Do you or anyone in your family have previous history of blood clots? No   Do you or does anyone in your family have a serious bleeding problem such as prolonged bleeding following surgeries or cuts? Dual antiplatelet therapy    Have you ever had problems with anemia or been told to take iron pills? (!) YES 8.6 hgb on 6/6/25   Have you had any abnormal blood loss such as black, tarry or bloody stools, or abnormal vaginal bleeding? No   Have you ever had a blood transfusion? (!) YES   Have you ever had a transfusion reaction? No   Are you willing to have a blood transfusion if it is medically needed before, during, or after your surgery? Yes   Have you or any of your relatives ever had problems with anesthesia? No   Do you have sleep apnea, excessive snoring or daytime drowsiness? (!) UNKNOWN   Do you have any artifical heart valves or other implanted medical devices like a pacemaker, defibrillator, or continuous glucose monitor? Banding of varices in  6/2/25   What type of device do you have? cgm   Name of the clinic that manages your device health partners   Do you have artificial joints? No   Are you allergic to latex? No     Advance Care Planning  FULL CODE     Preoperative Review of    reviewed - intermittent low level benzodiazepine dosing       Patient Active Problem List    Diagnosis Date Noted    Chronic systolic congestive heart failure (H) 12/12/2024     Priority: Medium    Iron deficiency anemia due to chronic blood loss 11/27/2024     Priority: Medium    History of laser photocoagulation of retina, os 06/29/2023     Priority: Medium    Anisometropia 06/29/2023     Priority: Medium    Amblyopia, mod, of left eye 06/29/2023     Priority: Medium    Combined forms of age-related cataract, mild-mod, of both eyes 06/29/2023     Priority: Medium    Posterior vitreous detachment, left eye 06/29/2023     Priority: Medium    Gastroparesis 08/12/2022     Priority: Medium    Lower extremity edema 01/25/2022     Priority: Medium    Urge incontinence of urine 01/25/2022     Priority: Medium    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 (H) 08/05/2020     Priority: Medium    Adult attention deficit hyperactivity disorder 08/26/2019     Priority: Medium    Fatty liver 08/26/2019     Priority: Medium    Peripheral neuropathy 08/26/2019     Priority: Medium     Formatting of this note might be different from the original.  Formatting of this note might be different from the original.  Confirmed by emg  Formatting of this note might be different from the original.  Confirmed by emg      Type 2 diabetes mellitus with complication, with long-term current use of insulin (H) 06/11/2019     Priority: Medium    Severe episode of recurrent major depressive disorder, without psychotic features (H) 05/30/2019     Priority: Medium    Generalized anxiety disorder with panic attacks 05/14/2019     Priority: Medium    Mild persistent  asthma without complication 04/27/2018     Priority: Medium    Chronic neck pain 12/15/2015     Priority: Medium    Essential hypertension 12/15/2015     Priority: Medium    Gastroesophageal reflux disease without esophagitis 12/15/2015     Priority: Medium    Hyperlipidemia 12/15/2015     Priority: Medium      Past Medical History:   Diagnosis Date    Amblyopia     Arthritis     Diabetes (H)     Diabetic retinopathy associated with diabetes mellitus due to underlying condition (H)     Hypertension     Nonsenile cataract     Strabismus      No past surgical history on file.  Current Outpatient Medications   Medication Sig Dispense Refill    albuterol (PROAIR HFA/PROVENTIL HFA/VENTOLIN HFA) 108 (90 Base) MCG/ACT inhaler Inhale 1-2 puffs into the lungs every 4 hours as needed for wheezing 18 g 11    albuterol (PROVENTIL) (2.5 MG/3ML) 0.083% neb solution Take 1 vial (2.5 mg) by nebulization every 6 hours as needed for shortness of breath, wheezing or cough. 90 mL 1    ammonium lactate (LAC-HYDRIN) 12 % external lotion Apply topically as needed for dry skin. 500 g 1    aspirin 81 MG EC tablet Take 81 mg by mouth daily.      atorvastatin (LIPITOR) 80 MG tablet Take 1 tablet (80 mg) by mouth daily. 90 tablet 2    clonazePAM (KLONOPIN) 0.5 MG tablet Take 1 tablet (0.5 mg) by mouth 2 times daily as needed for anxiety. 60 tablet 5    clotrimazole (LOTRIMIN) 1 % external solution Apply topically 2 times daily. 60 mL 11    empagliflozin (JARDIANCE) 10 MG TABS tablet Take 1 tablet (10 mg) by mouth daily. 90 tablet 3    ezetimibe (ZETIA) 10 MG tablet Take 1 tablet (10 mg) by mouth daily. 90 tablet 2    ferrous sulfate (FEROSUL) 325 (65 Fe) MG tablet Take 325 mg by mouth every 48 hours.      fluticasone (ARNUITY ELLIPTA) 200 MCG/ACT inhaler Inhale 1 puff into the lungs at bedtime. 30 each 11    fluticasone (FLONASE) 50 MCG/ACT nasal spray Spray 1 spray in nostril daily as needed for allergies 16 g 11    furosemide (LASIX) 20 MG  tablet Take 1 tablet (20 mg) by mouth daily. 30 tablet 4    gabapentin (NEURONTIN) 600 MG tablet Take 1 tablet (600 mg) by mouth 3 times daily. 90 tablet 3    insulin aspart (NOVOLOG FLEXPEN) 100 UNIT/ML pen Inject 90 Units Subcutaneous 3 times daily (before meals)      insulin glargine (LANTUS SOLOSTAR) 100 UNIT/ML pen Inject 110 Units Subcutaneous 2 times daily      loratadine (CLARITIN) 10 MG tablet Take 10 mg by mouth daily.      losartan (COZAAR) 50 MG tablet Take 1 tablet (50 mg) by mouth daily. 90 tablet 1    metoprolol succinate ER (TOPROL XL) 25 MG 24 hr tablet Take 0.5 tablets (12.5 mg) by mouth daily. 90 tablet 3    pantoprazole (PROTONIX) 40 MG EC tablet Take 1 tablet (40 mg) by mouth daily. 90 tablet 3    potassium chloride ER (K-TAB/KLOR-CON) 10 MEQ CR tablet Take 2 tablets (20 mEq) by mouth daily. 180 tablet 3    pramipexole (MIRAPEX) 0.25 MG tablet Take 2 tablets (0.5 mg) by mouth at bedtime. 180 tablet 2    sertraline (ZOLOFT) 100 MG tablet Take 2 tablets (200 mg) by mouth daily. 180 tablet 1    triamcinolone (NASACORT) 55 MCG/ACT nasal aerosol Spray 2 sprays into both nostrils daily. 16.9 mL 11    blood glucose monitoring (NO BRAND SPECIFIED) meter device kit Use to test blood sugar 4 times daily or as directed. 1 kit 11    insulin pen needle (32G X 6 MM) 32G X 6 MM miscellaneous Use 4 pen needles daily or as directed. 120 each 2    isosorbide mononitrate (IMDUR) 30 MG 24 hr tablet Take 30 mg by mouth daily. (Patient not taking: Reported on 7/2/2025)      zinc oxide (DESITIN) 20 % external ointment Apply topically 6 times daily (Patient not taking: Reported on 7/2/2025) 500 g 5       Allergies   Allergen Reactions    Lisinopril Cough    Seasonal Allergies         Social History     Tobacco Use    Smoking status: Never     Passive exposure: Never    Smokeless tobacco: Never   Substance Use Topics    Alcohol use: Not on file     History   Drug Use Not on file             Objective    BP 92/54 (BP  "Location: Left arm, Patient Position: Sitting, Cuff Size: Adult Regular)   Pulse 95   Temp 97.9  F (36.6  C) (Temporal)   Ht 1.6 m (5' 3\")   Wt 74.4 kg (164 lb)   LMP 10/01/2013 (Approximate)   SpO2 100%   BMI 29.05 kg/m     Estimated body mass index is 29.05 kg/m  as calculated from the following:    Height as of this encounter: 1.6 m (5' 3\").    Weight as of this encounter: 74.4 kg (164 lb).    Physical Exam  Constitutional:       General: She is not in acute distress.  Eyes:      General: No scleral icterus.  Pulmonary:      Effort: No respiratory distress.   Neurological:      Mental Status: She is alert.   Psychiatric:         Mood and Affect: Mood normal.         Behavior: Behavior normal.         Recent Labs   Lab Test 05/01/25  0950 01/15/25  1541 11/26/24  1248   HGB 8.7* 9.1* 8.0*    117* 120*   INR  --   --  1.19*   NA  --  141 138   POTASSIUM  --  3.8 3.9   CR  --  0.50* 0.43*   A1C  --   --  10.9*        Diagnostics  No labs were ordered during this visit.   No EKG this visit, completed in the last 90 days.    Revised Cardiac Risk Index (RCRI)  The patient has the following serious cardiovascular risks for perioperative complications:   - Coronary Artery Disease (MI, positive stress test, angina, Qs on EKG) = 1 point   - Congestive Heart Failure (pulmonary edema, PND, s3 walter, CXR with pulmonary congestion, basilar rales) = 1 point   - Diabetes Mellitus (on Insulin) = 1 point     RCRI Interpretation: 3 points: Class IV (high risk - >11% complication rate)    Estimated Functional Capacity: CANNOT perform 4 METS without symptoms      Signed Electronically by: Jose Sanchez DO  A copy of this evaluation report is provided to the requesting physician.    More than 40 minutes spent in care coordination, chart review, charting, evaluation and management.         "

## 2025-07-02 NOTE — TELEPHONE ENCOUNTER
Attempted to call but after multiple rings went to voicemail, will call again in hour and a half.    Thanks!  Israel CORDOVA RN   [facility]

## 2025-07-02 NOTE — TELEPHONE ENCOUNTER
Please call patient 2 more times this afternoon.      Attempted to call patient due to critical lab value of 6.5 for hemoglobin. Called her x2 and her  x2. Sent each text on doximity. Advised them to call clinic emergently.      If they call back, relay that she needs to go to the ER right away. Likely has continued GI bleed from esophageal varices, and with heart condition will probably need transfusion and banding.      Jose Sanchez, DO

## 2025-07-09 ENCOUNTER — NURSE TRIAGE (OUTPATIENT)
Dept: NURSING | Facility: CLINIC | Age: 60
End: 2025-07-09
Payer: COMMERCIAL

## 2025-07-09 DIAGNOSIS — I50.22 CHRONIC SYSTOLIC CONGESTIVE HEART FAILURE (H): Primary | ICD-10-CM

## 2025-07-10 RX ORDER — NITROGLYCERIN 0.4 MG/1
TABLET SUBLINGUAL
Qty: 30 TABLET | Refills: 3 | Status: SHIPPED | OUTPATIENT
Start: 2025-07-10

## 2025-07-10 NOTE — TELEPHONE ENCOUNTER
Nurse Triage SBAR    Is this a 2nd Level Triage? YES, LICENSED PRACTITIONER REVIEW IS REQUIRED    Situation: Patient calling.     Background: Dizziness.     Assessment: Pt discharged from hospital five days ago. She has had low hemoglobin. She has been feeling dizzy today, but not right now.  She is diabetic, but blood sugar is over 200.  Her BP was 98/55 at 1800 today. She is also wondering if she should take her BP meds this evening. When she has had similar symptoms in the past, she has needed blood transfusions.     Protocol Recommended Disposition:   See HCP Within 4 Hours (Or PCP Triage)    Recommendation: Require provider input.      Paged to provider  [facility] Provider consult indicated.    Reason for page: 2LT    Specialty Group number: [id number]  Specialty Group: FM-Family Medicine    Initial page sent to Dr. Yanelis Durán by RN at 2015.     Melissa Curiel RN      Provider, Dr. Levi, returning page to Nurse Advisors at 2024    Provider recommended plan of care: Hold BP medication, Losartan, tonight and tomorrow morning. Go to ED if becoming more SOB or new symptoms. Make sure to go to follow up appt tomorrow. Route encounter to PCP.    Provider Recommendation Follow Up:   Reached patient/caregiver. Informed of provider's recommendations. Patient verbalized understanding and agrees with the plan.           Melissa Curiel RN          Does the patient meet one of the following criteria for ADS visit consideration? 16+ years old, with an [facility] PCP     TIP  Providers, please consider if this condition is appropriate for management at one of our Acute and Diagnostic Services sites.     If patient is a good candidate, please use dotphrase <dot>triageresponse and select Refer to ADS to document.    Reason for Disposition   [1] Systolic BP  AND [2] taking blood pressure medications AND [3] dizzy, lightheaded or weak    Additional Information   Negative: Started suddenly after an  "allergic medicine, an allergic food, or bee sting   Negative: Shock suspected (e.g., cold/pale/clammy skin, too weak to stand, low BP, rapid pulse)   Negative: Difficult to awaken or acting confused (e.g., disoriented, slurred speech)   Negative: Fainted   Negative: [1] Systolic BP < 90 AND [2] dizzy, lightheaded, or weak   Negative: Chest pain   Negative: Bleeding (e.g., vomiting blood, rectal bleeding or tarry stools, severe vaginal bleeding)(Exception: Fainted from sight of small amount of blood; small cut or abrasion.)   Negative: Extra heartbeats, irregular heart beating, or heart is beating very fast  (i.e., \"palpitations\")   Negative: Sounds like a life-threatening emergency to the triager   Negative: [1] Systolic BP < 80 AND [2] NOT dizzy, lightheaded or weak   Negative: Abdominal pain   Negative: Fever > 100.4 F (38.0 C)   Negative: Major surgery in the past month   Negative: [1] Drinking very little AND [2] dehydration suspected (e.g., no urine > 12 hours, very dry mouth, very lightheaded)   Negative: [1] Fall in systolic BP > 20 mm Hg from normal AND [2] dizzy, lightheaded, or weak   Negative: Patient sounds very sick or weak to the triager   Negative: [1] Systolic BP < 90 AND [2] NOT dizzy, lightheaded or weak    Protocols used: Blood Pressure - Low-A-    "

## 2025-07-10 NOTE — TELEPHONE ENCOUNTER
Called Gillian this morning.  She said she is feeling better from dizziness this morning.  Encouraged her to go to urgent care if she gets dizzy again to get a hgb stat.  She also mentioned some mild chest pain this morning.  Gone now. Discussed calling 911 if this happens again.  Do not take nitroglycerin if chest pain in next 24 hours because of low bp last night.  Instead call 911 if chest pain. Will send nitroglycerin to have around if chest pain occurs once blood pressure is better.      Jose Sanchez, DO

## 2025-07-14 ENCOUNTER — TELEPHONE (OUTPATIENT)
Dept: FAMILY MEDICINE | Facility: CLINIC | Age: 60
End: 2025-07-14
Payer: COMMERCIAL

## 2025-07-14 NOTE — TELEPHONE ENCOUNTER
Forms/Letter Request    Type of form/letter: Order #: [id number] - [facility] (7/8/25-9/5/25)     Who is the form from? [facility]    Where did/will the form come from? Form was faxed in    How would you like the form/letter returned: Fax to [phone]    Where form is located: Daniel rao

## 2025-07-15 ENCOUNTER — TELEPHONE (OUTPATIENT)
Dept: FAMILY MEDICINE | Facility: CLINIC | Age: 60
End: 2025-07-15
Payer: COMMERCIAL

## 2025-07-15 NOTE — TELEPHONE ENCOUNTER
Reason for Call (Form)    Goal: Our goal is to have forms completed within 72 hours. However, some forms may require a visit or additional information.    Type of Letter, Form, or Note: Cert: 7/8 -9/5 (Order #[id number] + Order #[id number] + Order#[id number])  Received From: [employer], via fax    Placed At: Dr. Sanchez, to sign basket    Additional Comments:    Return fax [phone]

## 2025-07-16 ENCOUNTER — TELEPHONE (OUTPATIENT)
Dept: FAMILY MEDICINE | Facility: CLINIC | Age: 60
End: 2025-07-16
Payer: COMMERCIAL

## 2025-07-16 NOTE — TELEPHONE ENCOUNTER
Forms/Letter Request    Type of form/letter: Order No: #[id number]     Who is the form from? [facility]    Where did/will the form come from? Form was faxed in    How would you like the form/letter returned: Fax to [phone]    Where form is located: Brown to sign basket    Additional Information: Resume Home Care Services 7/16/25

## 2025-07-17 ENCOUNTER — TELEPHONE (OUTPATIENT)
Dept: FAMILY MEDICINE | Facility: CLINIC | Age: 60
End: 2025-07-17

## 2025-07-17 NOTE — TELEPHONE ENCOUNTER
Forms/Letter Request    Type of form/letter: Order #: [id number] & [id number]     Who is the form from? [facility]    Where did/will the form come from? Form was faxed in    How would you like the form/letter returned: Fax to [phone]    Where form is located: Brown to sign basket    Additional Information: Resuming home care and discontinuing PT

## 2025-07-19 ENCOUNTER — HEALTH MAINTENANCE LETTER (OUTPATIENT)
Age: 60
End: 2025-07-19

## 2025-07-21 ENCOUNTER — TELEPHONE (OUTPATIENT)
Dept: FAMILY MEDICINE | Facility: CLINIC | Age: 60
End: 2025-07-21
Payer: COMMERCIAL

## 2025-07-21 NOTE — TELEPHONE ENCOUNTER
"Forms/Letter Request    Type of form/letter: Order #[id number]     Who is the form from? [facility]    Where did/will the form come from? Form was faxed in    How would you like the form/letter returned: Fax to [phone]    Where form is located: Brown to sign basket    Additional Information: \"Schedule BASIM for 7/21\"  "

## 2025-07-22 ENCOUNTER — MEDICAL CORRESPONDENCE (OUTPATIENT)
Dept: HEALTH INFORMATION MANAGEMENT | Facility: CLINIC | Age: 60
End: 2025-07-22

## 2025-07-22 ENCOUNTER — OFFICE VISIT (OUTPATIENT)
Dept: FAMILY MEDICINE | Facility: CLINIC | Age: 60
End: 2025-07-22
Payer: COMMERCIAL

## 2025-07-22 ENCOUNTER — TELEPHONE (OUTPATIENT)
Dept: FAMILY MEDICINE | Facility: CLINIC | Age: 60
End: 2025-07-22

## 2025-07-22 VITALS
OXYGEN SATURATION: 98 % | DIASTOLIC BLOOD PRESSURE: 68 MMHG | SYSTOLIC BLOOD PRESSURE: 108 MMHG | TEMPERATURE: 97.3 F | HEART RATE: 87 BPM | RESPIRATION RATE: 20 BRPM

## 2025-07-22 DIAGNOSIS — D61.818 PANCYTOPENIA (H): ICD-10-CM

## 2025-07-22 DIAGNOSIS — I25.2 HISTORY OF NON-ST ELEVATION MYOCARDIAL INFARCTION (NSTEMI): ICD-10-CM

## 2025-07-22 DIAGNOSIS — E11.3293 MILD NONPROLIFERATIVE DIABETIC RETINOPATHY OF BOTH EYES WITHOUT MACULAR EDEMA ASSOCIATED WITH TYPE 2 DIABETES MELLITUS (H): ICD-10-CM

## 2025-07-22 DIAGNOSIS — K92.2 GASTROINTESTINAL HEMORRHAGE, UNSPECIFIED GASTROINTESTINAL HEMORRHAGE TYPE: ICD-10-CM

## 2025-07-22 DIAGNOSIS — J45.50 SEVERE PERSISTENT ASTHMA WITHOUT COMPLICATION (H): ICD-10-CM

## 2025-07-22 DIAGNOSIS — I50.22 CHRONIC SYSTOLIC CONGESTIVE HEART FAILURE (H): ICD-10-CM

## 2025-07-22 DIAGNOSIS — I10 ESSENTIAL HYPERTENSION: Primary | ICD-10-CM

## 2025-07-22 DIAGNOSIS — F33.2 SEVERE EPISODE OF RECURRENT MAJOR DEPRESSIVE DISORDER, WITHOUT PSYCHOTIC FEATURES (H): ICD-10-CM

## 2025-07-22 DIAGNOSIS — D62 ANEMIA DUE TO BLOOD LOSS, ACUTE: ICD-10-CM

## 2025-07-22 DIAGNOSIS — E11.42 DIABETIC POLYNEUROPATHY ASSOCIATED WITH TYPE 2 DIABETES MELLITUS (H): ICD-10-CM

## 2025-07-22 PROBLEM — E11.3299 MILD NONPROLIFERATIVE DIABETIC RETINOPATHY ASSOCIATED WITH TYPE 2 DIABETES MELLITUS (H): Status: RESOLVED | Noted: 2020-08-05 | Resolved: 2025-07-22

## 2025-07-22 LAB
ANION GAP SERPL CALCULATED.3IONS-SCNC: 12 MMOL/L (ref 7–15)
BUN SERPL-MCNC: 13.1 MG/DL (ref 6–20)
CALCIUM SERPL-MCNC: 9.5 MG/DL (ref 8.8–10.4)
CHLORIDE SERPL-SCNC: 104 MMOL/L (ref 98–107)
CREAT SERPL-MCNC: 0.57 MG/DL (ref 0.51–0.95)
EGFRCR SERPLBLD CKD-EPI 2021: >90 ML/MIN/1.73M2
ERYTHROCYTE [DISTWIDTH] IN BLOOD BY AUTOMATED COUNT: 16.3 % (ref 10–15)
GLUCOSE SERPL-MCNC: 324 MG/DL (ref 70–99)
HCO3 SERPL-SCNC: 23 MMOL/L (ref 22–29)
HCT VFR BLD AUTO: 28.5 % (ref 35–47)
HGB BLD-MCNC: 8.3 G/DL (ref 11.7–15.7)
IRON BINDING CAPACITY (ROCHE): 416 UG/DL (ref 240–430)
IRON SATN MFR SERPL: 77 % (ref 15–46)
IRON SERPL-MCNC: 321 UG/DL (ref 37–145)
MCH RBC QN AUTO: 23.9 PG (ref 26.5–33)
MCHC RBC AUTO-ENTMCNC: 29.1 G/DL (ref 31.5–36.5)
MCV RBC AUTO: 82 FL (ref 78–100)
PLATELET # BLD AUTO: 138 10E3/UL (ref 150–450)
POTASSIUM SERPL-SCNC: 4.3 MMOL/L (ref 3.4–5.3)
RBC # BLD AUTO: 3.47 10E6/UL (ref 3.8–5.2)
RETICS # AUTO: 0.1 10E6/UL (ref 0.03–0.1)
RETICS/RBC NFR AUTO: 2.9 % (ref 0.5–2)
SODIUM SERPL-SCNC: 139 MMOL/L (ref 135–145)
WBC # BLD AUTO: 4.7 10E3/UL (ref 4–11)

## 2025-07-22 PROCEDURE — 36415 COLL VENOUS BLD VENIPUNCTURE: CPT | Performed by: FAMILY MEDICINE

## 2025-07-22 PROCEDURE — 85045 AUTOMATED RETICULOCYTE COUNT: CPT | Performed by: FAMILY MEDICINE

## 2025-07-22 PROCEDURE — 83550 IRON BINDING TEST: CPT | Performed by: FAMILY MEDICINE

## 2025-07-22 PROCEDURE — 1126F AMNT PAIN NOTED NONE PRSNT: CPT | Performed by: FAMILY MEDICINE

## 2025-07-22 PROCEDURE — 3074F SYST BP LT 130 MM HG: CPT | Performed by: FAMILY MEDICINE

## 2025-07-22 PROCEDURE — 80048 BASIC METABOLIC PNL TOTAL CA: CPT | Performed by: FAMILY MEDICINE

## 2025-07-22 PROCEDURE — 3078F DIAST BP <80 MM HG: CPT | Performed by: FAMILY MEDICINE

## 2025-07-22 PROCEDURE — 83540 ASSAY OF IRON: CPT | Performed by: FAMILY MEDICINE

## 2025-07-22 PROCEDURE — 85027 COMPLETE CBC AUTOMATED: CPT | Performed by: FAMILY MEDICINE

## 2025-07-22 PROCEDURE — 99214 OFFICE O/P EST MOD 30 MIN: CPT | Performed by: FAMILY MEDICINE

## 2025-07-22 PROCEDURE — G2211 COMPLEX E/M VISIT ADD ON: HCPCS | Performed by: FAMILY MEDICINE

## 2025-07-22 ASSESSMENT — PAIN SCALES - GENERAL: PAINLEVEL_OUTOF10: NO PAIN (0)

## 2025-07-22 NOTE — TELEPHONE ENCOUNTER
Forms/Letter Request    Type of form/letter: Order #[id number]     Who is the form from? [facility]    Where did/will the form come from? Form was faxed in    How would you like the form/letter returned: Fax to [phone]    Where form is located: Daniel rao

## 2025-07-22 NOTE — PROGRESS NOTES
Assessment & Plan     Essential hypertension:  - Monitor blood pressure at home. Discontinued losartan 12.5 mg as per after-hours doctor recommendation. Continue metoprolol.    Gastrointestinal hemorrhage, unspecified gastrointestinal hemorrhage type:  - Monitor hemoglobin levels. Consider pill cam to investigate potential sources of bleeding in the small intestine.    Anemia due to blood loss, acute:  - Anemia likely due to ongoing blood loss. Reticulocyte count to assess blood production.  - Check CBC to monitor hemoglobin levels. Consider further investigation if hemoglobin remains low.    Chronic systolic congestive heart failure:  - Await cardiology input for management of heart failure and medication adjustments.    Severe episode of recurrent major depressive disorder, without psychotic features:  - Patient reports feeling depressed due to health issues and family separation.  - Referral to counseling for mental health support.    Consent was obtained from the patient to use an AI documentation tool in the creation of this note.        Ky French is a 59 year old, presenting for the following health issues:  Hypotension and Follow Up      7/22/2025     1:06 PM   Additional Questions   Roomed by sergey   Accompanied by her      History of Present Illness       Reason for visit:  Follow up from hospital stay    She eats 2-3 servings of fruits and vegetables daily.She consumes 1 sweetened beverage(s) daily.She exercises with enough effort to increase her heart rate 9 or less minutes per day.  She exercises with enough effort to increase her heart rate 3 or less days per week.   She is taking medications regularly.              Objective    /68   Pulse 87   Temp 97.3  F (36.3  C) (Temporal)   Resp 20   LMP 10/01/2013 (Approximate)   SpO2 98%   There is no height or weight on file to calculate BMI.  Physical Exam  Constitutional:       General: She is not in acute distress.  Eyes:       General: No scleral icterus.  Pulmonary:      Effort: No respiratory distress.   Neurological:      Mental Status: She is alert.   Psychiatric:         Mood and Affect: Mood normal.         Behavior: Behavior normal.                  Signed Electronically by: Jose Sanchez DO

## 2025-07-23 ENCOUNTER — PATIENT OUTREACH (OUTPATIENT)
Dept: CARE COORDINATION | Facility: CLINIC | Age: 60
End: 2025-07-23

## 2025-07-28 ENCOUNTER — TELEPHONE (OUTPATIENT)
Dept: FAMILY MEDICINE | Facility: CLINIC | Age: 60
End: 2025-07-28

## 2025-07-28 ENCOUNTER — TRANSFERRED RECORDS (OUTPATIENT)
Dept: HEALTH INFORMATION MANAGEMENT | Facility: CLINIC | Age: 60
End: 2025-07-28

## 2025-07-28 DIAGNOSIS — E11.8 TYPE 2 DIABETES MELLITUS WITH COMPLICATION, WITH LONG-TERM CURRENT USE OF INSULIN (H): ICD-10-CM

## 2025-07-28 DIAGNOSIS — Z79.4 TYPE 2 DIABETES MELLITUS WITH COMPLICATION, WITH LONG-TERM CURRENT USE OF INSULIN (H): ICD-10-CM

## 2025-07-28 NOTE — TELEPHONE ENCOUNTER
"Forms/Letter Request    Type of form/letter: Order #[id number]     Who is the form from? [facility]    Where did/will the form come from? Form was faxed in    How would you like the form/letter returned: Fax to [phone]    Where form is located: Brown to sign basket    Additional Information: \"OK for Pt to eval and treat\"  "

## 2025-07-28 NOTE — TELEPHONE ENCOUNTER
"Forms/Letter Request    Type of form/letter: Order #[id number]     Who is the form from? [facility]    Where did/will the form come from? Form was faxed in    How would you like the form/letter returned: Fax to [phone]    Where form is located: Brown to sign basket    Additional Information: \"OT evalulation performed. No additional visits required.\"  "

## 2025-07-31 ENCOUNTER — TELEPHONE (OUTPATIENT)
Dept: FAMILY MEDICINE | Facility: CLINIC | Age: 60
End: 2025-07-31

## 2025-07-31 DIAGNOSIS — Z79.4 TYPE 2 DIABETES MELLITUS WITH COMPLICATION, WITH LONG-TERM CURRENT USE OF INSULIN (H): ICD-10-CM

## 2025-07-31 DIAGNOSIS — E11.8 TYPE 2 DIABETES MELLITUS WITH COMPLICATION, WITH LONG-TERM CURRENT USE OF INSULIN (H): ICD-10-CM

## 2025-07-31 RX ORDER — ATORVASTATIN CALCIUM 80 MG/1
80 TABLET, FILM COATED ORAL DAILY
Qty: 90 TABLET | Refills: 2 | Status: SHIPPED | OUTPATIENT
Start: 2025-07-31

## 2025-07-31 NOTE — TELEPHONE ENCOUNTER
Spoke to patient. She is overdue to fill atorvastatin. Per our records, last filled 5/29/25 for 30 day supply and is 27 days late to fill. Refill sent per protocol and advised she call pharmacy to fill. She verbalized understanding.     Alissa Cornelius, PharmD, [facility]  Population Health Pharmacist  [phone]

## 2025-07-31 NOTE — TELEPHONE ENCOUNTER
"Forms/Letter Request    Type of form/letter: Order #[id number]     Who is the form from? [facility]    Where did/will the form come from? Form was faxed in    How would you like the form/letter returned: Fax to [phone]    Where form is located: Brown to sign basket    Additional Information: \"Patient hospitalized. Hold all services.\"  "

## 2025-08-04 ENCOUNTER — TELEPHONE (OUTPATIENT)
Dept: FAMILY MEDICINE | Facility: CLINIC | Age: 60
End: 2025-08-04
Payer: COMMERCIAL

## 2025-08-05 ENCOUNTER — MEDICAL CORRESPONDENCE (OUTPATIENT)
Dept: HEALTH INFORMATION MANAGEMENT | Facility: CLINIC | Age: 60
End: 2025-08-05
Payer: COMMERCIAL

## 2025-08-06 ENCOUNTER — TELEPHONE (OUTPATIENT)
Dept: FAMILY MEDICINE | Facility: CLINIC | Age: 60
End: 2025-08-06

## 2025-08-06 ENCOUNTER — LAB (OUTPATIENT)
Dept: LAB | Facility: CLINIC | Age: 60
End: 2025-08-06
Payer: COMMERCIAL

## 2025-08-06 DIAGNOSIS — D62 ANEMIA DUE TO BLOOD LOSS, ACUTE: Primary | ICD-10-CM

## 2025-08-06 DIAGNOSIS — D62 ANEMIA DUE TO BLOOD LOSS, ACUTE: ICD-10-CM

## 2025-08-06 LAB
ERYTHROCYTE [DISTWIDTH] IN BLOOD BY AUTOMATED COUNT: 22 % (ref 10–15)
HCT VFR BLD AUTO: 32.5 % (ref 35–47)
HGB BLD-MCNC: 9.8 G/DL (ref 11.7–15.7)
MCH RBC QN AUTO: 27.1 PG (ref 26.5–33)
MCHC RBC AUTO-ENTMCNC: 30.2 G/DL (ref 31.5–36.5)
MCV RBC AUTO: 90 FL (ref 78–100)
PLATELET # BLD AUTO: 114 10E3/UL (ref 150–450)
RBC # BLD AUTO: 3.62 10E6/UL (ref 3.8–5.2)
WBC # BLD AUTO: 5.4 10E3/UL (ref 4–11)

## 2025-08-06 PROCEDURE — 36415 COLL VENOUS BLD VENIPUNCTURE: CPT

## 2025-08-06 PROCEDURE — 85027 COMPLETE CBC AUTOMATED: CPT

## 2025-08-11 ENCOUNTER — TELEPHONE (OUTPATIENT)
Dept: FAMILY MEDICINE | Facility: CLINIC | Age: 60
End: 2025-08-11

## 2025-08-14 ENCOUNTER — VIRTUAL VISIT (OUTPATIENT)
Dept: FAMILY MEDICINE | Facility: CLINIC | Age: 60
End: 2025-08-14
Payer: COMMERCIAL

## 2025-08-14 ENCOUNTER — MYC REFILL (OUTPATIENT)
Dept: FAMILY MEDICINE | Facility: CLINIC | Age: 60
End: 2025-08-14

## 2025-08-14 DIAGNOSIS — E11.8 TYPE 2 DIABETES MELLITUS WITH COMPLICATION, WITH LONG-TERM CURRENT USE OF INSULIN (H): ICD-10-CM

## 2025-08-14 DIAGNOSIS — J45.50 SEVERE PERSISTENT ASTHMA WITHOUT COMPLICATION (H): Primary | ICD-10-CM

## 2025-08-14 DIAGNOSIS — F33.2 SEVERE EPISODE OF RECURRENT MAJOR DEPRESSIVE DISORDER, WITHOUT PSYCHOTIC FEATURES (H): ICD-10-CM

## 2025-08-14 DIAGNOSIS — K92.2 GASTROINTESTINAL HEMORRHAGE, UNSPECIFIED GASTROINTESTINAL HEMORRHAGE TYPE: ICD-10-CM

## 2025-08-14 DIAGNOSIS — J30.2 SEASONAL ALLERGIC RHINITIS, UNSPECIFIED TRIGGER: ICD-10-CM

## 2025-08-14 DIAGNOSIS — J45.30 MILD PERSISTENT ASTHMA WITHOUT COMPLICATION: ICD-10-CM

## 2025-08-14 DIAGNOSIS — J45.31 MILD PERSISTENT ASTHMA WITH ACUTE EXACERBATION: ICD-10-CM

## 2025-08-14 DIAGNOSIS — Z79.4 TYPE 2 DIABETES MELLITUS WITH COMPLICATION, WITH LONG-TERM CURRENT USE OF INSULIN (H): ICD-10-CM

## 2025-08-14 RX ORDER — ALBUTEROL SULFATE 90 UG/1
1-2 INHALANT RESPIRATORY (INHALATION) EVERY 4 HOURS PRN
Qty: 18 G | Refills: 11 | Status: SHIPPED | OUTPATIENT
Start: 2025-08-14

## 2025-08-14 RX ORDER — GABAPENTIN 600 MG/1
600 TABLET ORAL 3 TIMES DAILY
Qty: 90 TABLET | Refills: 3 | OUTPATIENT
Start: 2025-08-14

## 2025-08-14 RX ORDER — TRIAMCINOLONE ACETONIDE 55 UG/1
2 SPRAY, METERED NASAL DAILY
Qty: 16.9 ML | Refills: 10 | Status: SHIPPED | OUTPATIENT
Start: 2025-08-14

## 2025-08-14 RX ORDER — GABAPENTIN 600 MG/1
600 TABLET ORAL 3 TIMES DAILY
Qty: 90 TABLET | Refills: 11 | Status: SHIPPED | OUTPATIENT
Start: 2025-08-14

## 2025-08-14 RX ORDER — FLUTICASONE FUROATE 200 UG/1
1 POWDER RESPIRATORY (INHALATION) AT BEDTIME
Qty: 30 EACH | Refills: 11 | OUTPATIENT
Start: 2025-08-14

## 2025-08-14 RX ORDER — FLUTICASONE PROPIONATE 50 MCG
1 SPRAY, SUSPENSION (ML) NASAL DAILY PRN
Qty: 16 G | Refills: 10 | Status: SHIPPED | OUTPATIENT
Start: 2025-08-14

## 2025-08-14 RX ORDER — PANTOPRAZOLE SODIUM 40 MG/1
40 TABLET, DELAYED RELEASE ORAL DAILY
Qty: 90 TABLET | Refills: 2 | Status: SHIPPED | OUTPATIENT
Start: 2025-08-14

## 2025-08-14 RX ORDER — FLUTICASONE FUROATE AND VILANTEROL 200; 25 UG/1; UG/1
1 POWDER RESPIRATORY (INHALATION) DAILY
Qty: 84 EACH | Refills: 3 | Status: SHIPPED | OUTPATIENT
Start: 2025-08-14

## 2025-08-14 RX ORDER — ALBUTEROL SULFATE 0.83 MG/ML
2.5 SOLUTION RESPIRATORY (INHALATION) EVERY 6 HOURS PRN
Qty: 90 ML | Refills: 1 | Status: SHIPPED | OUTPATIENT
Start: 2025-08-14

## 2025-08-14 ASSESSMENT — ASTHMA QUESTIONNAIRES
QUESTION_4 LAST FOUR WEEKS HOW OFTEN HAVE YOU USED YOUR RESCUE INHALER OR NEBULIZER MEDICATION (SUCH AS ALBUTEROL): TWO OR THREE TIMES PER WEEK
QUESTION_5 LAST FOUR WEEKS HOW WOULD YOU RATE YOUR ASTHMA CONTROL: SOMEWHAT CONTROLLED
ACT_TOTALSCORE: 14
QUESTION_2 LAST FOUR WEEKS HOW OFTEN HAVE YOU HAD SHORTNESS OF BREATH: THREE TO SIX TIMES A WEEK
QUESTION_1 LAST FOUR WEEKS HOW MUCH OF THE TIME DID YOUR ASTHMA KEEP YOU FROM GETTING AS MUCH DONE AT WORK, SCHOOL OR AT HOME: SOME OF THE TIME
QUESTION_3 LAST FOUR WEEKS HOW OFTEN DID YOUR ASTHMA SYMPTOMS (WHEEZING, COUGHING, SHORTNESS OF BREATH, CHEST TIGHTNESS OR PAIN) WAKE YOU UP AT NIGHT OR EARLIER THAN USUAL IN THE MORNING: TWO OR THREE NIGHTS A WEEK

## 2025-08-14 ASSESSMENT — PATIENT HEALTH QUESTIONNAIRE - PHQ9
SUM OF ALL RESPONSES TO PHQ QUESTIONS 1-9: 15
10. IF YOU CHECKED OFF ANY PROBLEMS, HOW DIFFICULT HAVE THESE PROBLEMS MADE IT FOR YOU TO DO YOUR WORK, TAKE CARE OF THINGS AT HOME, OR GET ALONG WITH OTHER PEOPLE: SOMEWHAT DIFFICULT
SUM OF ALL RESPONSES TO PHQ QUESTIONS 1-9: 15

## 2025-08-18 ENCOUNTER — TELEPHONE (OUTPATIENT)
Dept: FAMILY MEDICINE | Facility: CLINIC | Age: 60
End: 2025-08-18
Payer: COMMERCIAL

## 2025-08-18 ENCOUNTER — VIRTUAL VISIT (OUTPATIENT)
Dept: PSYCHOLOGY | Facility: CLINIC | Age: 60
End: 2025-08-18
Attending: FAMILY MEDICINE
Payer: COMMERCIAL

## 2025-08-18 DIAGNOSIS — F41.1 GAD (GENERALIZED ANXIETY DISORDER): ICD-10-CM

## 2025-08-18 DIAGNOSIS — F34.1 PERSISTENT DEPRESSIVE DISORDER: Primary | ICD-10-CM

## 2025-08-18 DIAGNOSIS — F43.10 POSTTRAUMATIC STRESS DISORDER: ICD-10-CM

## 2025-08-18 PROCEDURE — 90791 PSYCH DIAGNOSTIC EVALUATION: CPT | Mod: 95

## 2025-08-18 ASSESSMENT — ANXIETY QUESTIONNAIRES
1. FEELING NERVOUS, ANXIOUS, OR ON EDGE: SEVERAL DAYS
5. BEING SO RESTLESS THAT IT IS HARD TO SIT STILL: NEARLY EVERY DAY
GAD7 TOTAL SCORE: 16
2. NOT BEING ABLE TO STOP OR CONTROL WORRYING: SEVERAL DAYS
7. FEELING AFRAID AS IF SOMETHING AWFUL MIGHT HAPPEN: NEARLY EVERY DAY
3. WORRYING TOO MUCH ABOUT DIFFERENT THINGS: NEARLY EVERY DAY
GAD7 TOTAL SCORE: 16
6. BECOMING EASILY ANNOYED OR IRRITABLE: MORE THAN HALF THE DAYS

## 2025-08-18 ASSESSMENT — PATIENT HEALTH QUESTIONNAIRE - PHQ9
SUM OF ALL RESPONSES TO PHQ QUESTIONS 1-9: 14
5. POOR APPETITE OR OVEREATING: NEARLY EVERY DAY
SUM OF ALL RESPONSES TO PHQ QUESTIONS 1-9: 14
10. IF YOU CHECKED OFF ANY PROBLEMS, HOW DIFFICULT HAVE THESE PROBLEMS MADE IT FOR YOU TO DO YOUR WORK, TAKE CARE OF THINGS AT HOME, OR GET ALONG WITH OTHER PEOPLE: SOMEWHAT DIFFICULT

## 2025-08-19 ENCOUNTER — TELEPHONE (OUTPATIENT)
Dept: FAMILY MEDICINE | Facility: CLINIC | Age: 60
End: 2025-08-19
Payer: COMMERCIAL

## 2025-08-20 ENCOUNTER — MEDICAL CORRESPONDENCE (OUTPATIENT)
Dept: HEALTH INFORMATION MANAGEMENT | Facility: CLINIC | Age: 60
End: 2025-08-20
Payer: COMMERCIAL